# Patient Record
Sex: MALE | Race: BLACK OR AFRICAN AMERICAN | NOT HISPANIC OR LATINO | Employment: OTHER | URBAN - METROPOLITAN AREA
[De-identification: names, ages, dates, MRNs, and addresses within clinical notes are randomized per-mention and may not be internally consistent; named-entity substitution may affect disease eponyms.]

---

## 2017-08-04 ENCOUNTER — TELEPHONE (OUTPATIENT)
Dept: OTHER | Facility: OUTPATIENT CENTER | Age: 57
End: 2017-08-04

## 2017-08-04 NOTE — TELEPHONE ENCOUNTER
[facility] Telephone Intake    Date:  2017  Client Name:  Luis Godinez  Preferred Name: Luis    MRN:  [id number]   :  1960       Age:  56 year old     Presenting Problem / Reason for Assessment   (Clinical History &Symptoms):     Pt states that a foot fetish is becoming an obsessive behavior and effecting their daily thoguhts to the point where they cannot complete daily tasks.     Suggested Program:  CSB    Length of time experiencing Symptoms: last few years    Seen Other Providers (if so, where):  M.D. :  [facility]  Therapist:  no  Psychiatrist:  no    Is presenting concern primarily sexual or mental health:      Couples:  No    Medications:    Pt cannot recall.    Diagnosis (if known):  Pt cannot recall, says most meds are for pain.    Referral Source:  friend    Legal:  Has one charge for forging a Dr Rx for medication. Is currently on probation.     Follow Up:    Insurance Benefits to be evaluated.  Note will be entered when validated.    Patient wishes to be contacted regarding Insurance benefits: NO    Please Verify Registration    Please send Welcome Packet and document date sent.

## 2017-08-08 NOTE — TELEPHONE ENCOUNTER
PER [facility] MA - NO CO-PAY, NO IND DEDUCT, NO CO-INS, NO OOPM - NO AUTH REQUIRED, NO EXCLUSIONS. PATIENT DID NOT WANT TO BE CONTACTED RE: TRAMAINE.

## 2017-11-20 ENCOUNTER — TRANSFERRED RECORDS (OUTPATIENT)
Dept: HEALTH INFORMATION MANAGEMENT | Facility: CLINIC | Age: 57
End: 2017-11-20

## 2017-12-08 ENCOUNTER — TRANSFERRED RECORDS (OUTPATIENT)
Dept: HEALTH INFORMATION MANAGEMENT | Facility: CLINIC | Age: 57
End: 2017-12-08

## 2017-12-18 ENCOUNTER — TRANSFERRED RECORDS (OUTPATIENT)
Dept: HEALTH INFORMATION MANAGEMENT | Facility: CLINIC | Age: 57
End: 2017-12-18

## 2018-02-06 ENCOUNTER — TELEPHONE (OUTPATIENT)
Dept: NEUROSURGERY | Facility: CLINIC | Age: 58
End: 2018-02-06

## 2018-09-13 ENCOUNTER — HOSPITAL ENCOUNTER (EMERGENCY)
Facility: CLINIC | Age: 58
Discharge: HOME | End: 2018-09-13
Attending: EMERGENCY MEDICINE | Admitting: EMERGENCY MEDICINE
Payer: COMMERCIAL

## 2018-09-13 ENCOUNTER — APPOINTMENT (OUTPATIENT)
Dept: GENERAL RADIOLOGY | Facility: CLINIC | Age: 58
End: 2018-09-13
Attending: EMERGENCY MEDICINE
Payer: COMMERCIAL

## 2018-09-13 VITALS
RESPIRATION RATE: 20 BRPM | OXYGEN SATURATION: 95 % | TEMPERATURE: 97.1 F | DIASTOLIC BLOOD PRESSURE: 67 MMHG | HEART RATE: 85 BPM | SYSTOLIC BLOOD PRESSURE: 115 MMHG

## 2018-09-13 DIAGNOSIS — R07.89 ATYPICAL CHEST PAIN: ICD-10-CM

## 2018-09-13 PROBLEM — I26.99 OTHER PULMONARY EMBOLISM WITHOUT ACUTE COR PULMONALE, UNSPECIFIED CHRONICITY (H): Status: ACTIVE | Noted: 2018-07-13

## 2018-09-13 PROBLEM — F10.20 ALCOHOL USE DISORDER, MODERATE, DEPENDENCE (H): Status: ACTIVE | Noted: 2017-07-14

## 2018-09-13 PROBLEM — I26.99 ACUTE PULMONARY EMBOLISM (H): Status: ACTIVE | Noted: 2018-06-11

## 2018-09-13 LAB
ALBUMIN SERPL-MCNC: 3.7 G/DL (ref 3.4–5)
ALP SERPL-CCNC: 94 U/L (ref 40–150)
ALT SERPL W P-5'-P-CCNC: 67 U/L (ref 0–70)
ANION GAP SERPL CALCULATED.3IONS-SCNC: 8 MMOL/L (ref 3–14)
AST SERPL W P-5'-P-CCNC: 45 U/L (ref 0–45)
BASOPHILS # BLD AUTO: 0.1 10E9/L (ref 0–0.2)
BASOPHILS NFR BLD AUTO: 0.9 %
BILIRUB DIRECT SERPL-MCNC: <0.1 MG/DL (ref 0–0.2)
BILIRUB SERPL-MCNC: 0.2 MG/DL (ref 0.2–1.3)
BUN SERPL-MCNC: 18 MG/DL (ref 7–30)
CALCIUM SERPL-MCNC: 10.5 MG/DL (ref 8.5–10.1)
CHLORIDE SERPL-SCNC: 109 MMOL/L (ref 94–109)
CO2 SERPL-SCNC: 27 MMOL/L (ref 20–32)
CREAT SERPL-MCNC: 1.38 MG/DL (ref 0.66–1.25)
D DIMER PPP FEU-MCNC: 0.5 UG/ML FEU (ref 0–0.5)
DIFFERENTIAL METHOD BLD: NORMAL
EOSINOPHIL # BLD AUTO: 0.2 10E9/L (ref 0–0.7)
EOSINOPHIL NFR BLD AUTO: 2.8 %
ERYTHROCYTE [DISTWIDTH] IN BLOOD BY AUTOMATED COUNT: 14.1 % (ref 10–15)
GFR SERPL CREATININE-BSD FRML MDRD: 53 ML/MIN/1.7M2
GLUCOSE SERPL-MCNC: 118 MG/DL (ref 70–99)
HCT VFR BLD AUTO: 42.4 % (ref 40–53)
HGB BLD-MCNC: 13.5 G/DL (ref 13.3–17.7)
IMM GRANULOCYTES # BLD: 0.1 10E9/L (ref 0–0.4)
IMM GRANULOCYTES NFR BLD: 0.9 %
INR PPP: 1.04 (ref 0.86–1.14)
INTERPRETATION ECG - MUSE: NORMAL
LIPASE SERPL-CCNC: 217 U/L (ref 73–393)
LYMPHOCYTES # BLD AUTO: 1.3 10E9/L (ref 0.8–5.3)
LYMPHOCYTES NFR BLD AUTO: 23.4 %
MCH RBC QN AUTO: 28.3 PG (ref 26.5–33)
MCHC RBC AUTO-ENTMCNC: 31.8 G/DL (ref 31.5–36.5)
MCV RBC AUTO: 89 FL (ref 78–100)
MONOCYTES # BLD AUTO: 0.7 10E9/L (ref 0–1.3)
MONOCYTES NFR BLD AUTO: 13.1 %
NEUTROPHILS # BLD AUTO: 3.1 10E9/L (ref 1.6–8.3)
NEUTROPHILS NFR BLD AUTO: 58.9 %
NRBC # BLD AUTO: 0 10*3/UL
NRBC BLD AUTO-RTO: 0 /100
PLATELET # BLD AUTO: 204 10E9/L (ref 150–450)
POTASSIUM SERPL-SCNC: 3.9 MMOL/L (ref 3.4–5.3)
PROT SERPL-MCNC: 7.7 G/DL (ref 6.8–8.8)
RBC # BLD AUTO: 4.77 10E12/L (ref 4.4–5.9)
SODIUM SERPL-SCNC: 144 MMOL/L (ref 133–144)
TROPONIN I SERPL-MCNC: <0.015 UG/L (ref 0–0.04)
TROPONIN I SERPL-MCNC: <0.015 UG/L (ref 0–0.04)
WBC # BLD AUTO: 5.3 10E9/L (ref 4–11)

## 2018-09-13 PROCEDURE — 25000128 H RX IP 250 OP 636: Performed by: EMERGENCY MEDICINE

## 2018-09-13 PROCEDURE — 80048 BASIC METABOLIC PNL TOTAL CA: CPT | Performed by: EMERGENCY MEDICINE

## 2018-09-13 PROCEDURE — 80076 HEPATIC FUNCTION PANEL: CPT | Performed by: EMERGENCY MEDICINE

## 2018-09-13 PROCEDURE — 85379 FIBRIN DEGRADATION QUANT: CPT | Performed by: EMERGENCY MEDICINE

## 2018-09-13 PROCEDURE — 85610 PROTHROMBIN TIME: CPT | Performed by: EMERGENCY MEDICINE

## 2018-09-13 PROCEDURE — 83690 ASSAY OF LIPASE: CPT | Performed by: EMERGENCY MEDICINE

## 2018-09-13 PROCEDURE — 93005 ELECTROCARDIOGRAM TRACING: CPT

## 2018-09-13 PROCEDURE — 25000132 ZZH RX MED GY IP 250 OP 250 PS 637: Performed by: EMERGENCY MEDICINE

## 2018-09-13 PROCEDURE — 71046 X-RAY EXAM CHEST 2 VIEWS: CPT

## 2018-09-13 PROCEDURE — 84484 ASSAY OF TROPONIN QUANT: CPT | Performed by: EMERGENCY MEDICINE

## 2018-09-13 PROCEDURE — 96360 HYDRATION IV INFUSION INIT: CPT

## 2018-09-13 PROCEDURE — 99285 EMERGENCY DEPT VISIT HI MDM: CPT | Mod: 25

## 2018-09-13 PROCEDURE — 96361 HYDRATE IV INFUSION ADD-ON: CPT

## 2018-09-13 PROCEDURE — 85025 COMPLETE CBC W/AUTO DIFF WBC: CPT | Performed by: EMERGENCY MEDICINE

## 2018-09-13 PROCEDURE — 36415 COLL VENOUS BLD VENIPUNCTURE: CPT | Performed by: EMERGENCY MEDICINE

## 2018-09-13 RX ORDER — NITROGLYCERIN 0.4 MG/1
0.4 TABLET SUBLINGUAL EVERY 5 MIN PRN
Status: DISCONTINUED | OUTPATIENT
Start: 2018-09-13 | End: 2018-09-13 | Stop reason: HOSPADM

## 2018-09-13 RX ORDER — OMEPRAZOLE 40 MG/1
40 CAPSULE, DELAYED RELEASE ORAL DAILY
Qty: 30 CAPSULE | Refills: 1 | Status: SHIPPED | OUTPATIENT
Start: 2018-09-13 | End: 2019-01-17

## 2018-09-13 RX ADMIN — OMEPRAZOLE 20 MG: 20 CAPSULE, DELAYED RELEASE ORAL at 08:02

## 2018-09-13 RX ADMIN — NITROGLYCERIN 0.4 MG: 0.4 TABLET SUBLINGUAL at 06:33

## 2018-09-13 RX ADMIN — NITROGLYCERIN 0.4 MG: 0.4 TABLET SUBLINGUAL at 08:29

## 2018-09-13 RX ADMIN — SODIUM CHLORIDE 1000 ML: 9 INJECTION, SOLUTION INTRAVENOUS at 07:01

## 2018-09-13 ASSESSMENT — ENCOUNTER SYMPTOMS
SHORTNESS OF BREATH: 1
BACK PAIN: 0
NECK PAIN: 0

## 2018-09-13 NOTE — ED AVS SNAPSHOT
[facility] Emergency Department    [address]    [facility] [address]    Phone:  [phone]    Fax:  [phone]                                       Luis Godinez   MRN: [id number]    Department:  [facility] Emergency Department   Date of Visit:  9/13/2018           After Visit Summary Signature Page     I have received my discharge instructions, and my questions have been answered. I have discussed any challenges I see with this plan with the nurse or doctor.    ..........................................................................................................................................  Patient/Patient Representative Signature      ..........................................................................................................................................  Patient Representative Print Name and Relationship to Patient    ..................................................               ................................................  Date                                   Time    ..........................................................................................................................................  Reviewed by Signature/Title    ...................................................              ..............................................  Date                                               Time          22EPIC Rev 08/18

## 2018-09-13 NOTE — ED AVS SNAPSHOT
[facility] Emergency Department    [address]    [facility] [address]    Phone:  [phone]    Fax:  [phone]                                       Luis Godinez   MRN: [id number]    Department:  [facility] Emergency Department   Date of Visit:  9/13/2018           Patient Information     Date Of Birth          1960        Your diagnoses for this visit were:     Atypical chest pain        You were seen by Corrie Jensen MD.      Follow-up Information     Follow up with [facility] Emergency Department.    Specialty:  EMERGENCY MEDICINE    Why:  immediately , If symptoms worsen    Contact information:    [address]  [facility] [address] [phone]        Follow up with [facility] In 3 days.    Contact information:    [address] [facility]  [phone]          Discharge Instructions       Discharge Instructions  Chest Pain    You have been seen today for chest pain or discomfort.  At this time, your provider has found no signs that your chest pain is due to a serious or life-threatening condition, (or you have declined more testing and/or admission to the hospital). However, sometimes there is a serious problem that does not show up right away. Your evaluation today may not be complete and you may need further testing and evaluation.     Generally, every Emergency Department visit should have a follow-up clinic visit with either a primary or a specialty clinic/provider. Please follow-up as instructed by your emergency provider today.  Return to the Emergency Department if:    Your chest pain changes, gets worse, starts to happen more often, or comes with less activity.    You are newly short of breath.    You get very weak or tired.    You pass out or faint.    You have any new symptoms, like fever, cough, numb legs, or you cough up blood.    You have anything else that worries  you.    Until you follow-up with your regular provider, please do the following:    Take one aspirin daily unless you have an allergy or are told not to by your provider.    If a stress test appointment has been made, go to the appointment.    If you have questions, contact your regular provider.    Follow-up with your regular provider/clinic as directed; this is very important.    If you were given a prescription for medicine here today, be sure to read all of the information (including the package insert) that comes with your prescription.  This will include important information about the medicine, its side effects, and any warnings that you need to know about.  The pharmacist who fills the prescription can provide more information and answer questions you may have about the medicine.  If you have questions or concerns that the pharmacist cannot address, please call or return to the Emergency Department.       Remember that you can always come back to the Emergency Department if you are not able to see your regular provider in the amount of time listed above, if you get any new symptoms, or if there is anything that worries you.      24 Hour Appointment Hotline       To make an appointment at any [facility] call [phone] ([phone]). If you don't have a family doctor or clinic, we will help you find one. [facility] are conveniently located to serve the needs of you and your family.          ED Discharge Orders     Exercise Stress Echocardiogram       Administration of IV contrast will be tailored to this examination per the appropriate written protocol listed in the Echocardiography department Protocol Book, or by the supervising Cardiologist. This may result in an order change.    Use of contrast is at the discretion of the supervising Cardiologist.                     Review of your medicines      START taking        Dose / Directions Last dose taken    omeprazole 40 MG capsule   Commonly  known as:  priLOSEC   Dose:  40 mg   Quantity:  30 capsule        Take 1 capsule (40 mg) by mouth daily   Refills:  1          Our records show that you are taking the medicines listed below. If these are incorrect, please call your family doctor or clinic.        Dose / Directions Last dose taken    AMOXICILLIN PO        Refills:  0        ATORVASTATIN CALCIUM PO        Refills:  0        COUMADIN PO        Refills:  0        GABAPENTIN PO        Refills:  0        GLIPIZIDE PO        Refills:  0                Prescriptions were sent or printed at these locations (1 Prescription)                   Other Prescriptions                Printed at Department/Unit printer (1 of 1)         omeprazole (PRILOSEC) 40 MG capsule                Procedures and tests performed during your visit     Basic metabolic panel    CBC + differential    D dimer quantitative    EKG 12 lead    Hepatic panel    INR    IV access    Lipase    Troponin I    Troponin I (now)    XR Chest 2 Views      Orders Needing Specimen Collection     None      Pending Results     Date and Time Order Name Status Description    9/13/2018 0806 XR Chest 2 Views Preliminary             Pending Culture Results     No orders found from 9/11/2018 to 9/14/2018.            Pending Results Instructions     If you had any lab results that were not finalized at the time of your Discharge, you can call the ED Lab Result RN at [phone]. You will be contacted by this team for any positive Lab results or changes in treatment. The nurses are available 7 days a week from 10A to 6:30P.  You can leave a message 24 hours per day and they will return your call.        Test Results From Your Hospital Stay        9/13/2018  6:43 AM      Component Results     Component Value Ref Range & Units Status    Troponin I ES <0.015 0.000 - 0.045 ug/L Final    The 99th percentile for upper reference range is 0.045 ug/L.  Troponin values   in the range of 0.045 - 0.120 ug/L may be  associated with risks of adverse   clinical events.           9/13/2018  6:25 AM      Component Results     Component Value Ref Range & Units Status    WBC 5.3 4.0 - 11.0 10e9/L Final    RBC Count 4.77 4.4 - 5.9 10e12/L Final    Hemoglobin 13.5 13.3 - 17.7 g/dL Final    Hematocrit 42.4 40.0 - 53.0 % Final    MCV 89 78 - 100 fl Final    MCH 28.3 26.5 - 33.0 pg Final    MCHC 31.8 31.5 - 36.5 g/dL Final    RDW 14.1 10.0 - 15.0 % Final    Platelet Count 204 150 - 450 10e9/L Final    Diff Method Automated Method  Final    % Neutrophils 58.9 % Final    % Lymphocytes 23.4 % Final    % Monocytes 13.1 % Final    % Eosinophils 2.8 % Final    % Basophils 0.9 % Final    % Immature Granulocytes 0.9 % Final    Nucleated RBCs 0 0 /100 Final    Absolute Neutrophil 3.1 1.6 - 8.3 10e9/L Final    Absolute Lymphocytes 1.3 0.8 - 5.3 10e9/L Final    Absolute Monocytes 0.7 0.0 - 1.3 10e9/L Final    Absolute Eosinophils 0.2 0.0 - 0.7 10e9/L Final    Absolute Basophils 0.1 0.0 - 0.2 10e9/L Final    Abs Immature Granulocytes 0.1 0 - 0.4 10e9/L Final    Absolute Nucleated RBC 0.0  Final         9/13/2018  6:43 AM      Component Results     Component Value Ref Range & Units Status    Sodium 144 133 - 144 mmol/L Final    Potassium 3.9 3.4 - 5.3 mmol/L Final    Chloride 109 94 - 109 mmol/L Final    Carbon Dioxide 27 20 - 32 mmol/L Final    Anion Gap 8 3 - 14 mmol/L Final    Glucose 118 (H) 70 - 99 mg/dL Final    Urea Nitrogen 18 7 - 30 mg/dL Final    Creatinine 1.38 (H) 0.66 - 1.25 mg/dL Final    GFR Estimate 53 (L) >60 mL/min/1.7m2 Final    Non  GFR Calc    GFR Estimate If Black 64 >60 mL/min/1.7m2 Final    African American GFR Calc    Calcium 10.5 (H) 8.5 - 10.1 mg/dL Final         9/13/2018  6:36 AM      Component Results     Component Value Ref Range & Units Status    D Dimer 0.5 0.0 - 0.50 ug/ml FEU Final    This D-dimer assay is intended for use in conjunction with a clinical pretest   probability assessment model to  exclude pulmonary embolism (PE) and deep   venous thrombosis (DVT) in outpatients suspected of PE or DVT. The cut-off   value is 0.5 ug/mL FEU.           9/13/2018  6:36 AM      Component Results     Component Value Ref Range & Units Status    INR 1.04 0.86 - 1.14 Final         9/13/2018  7:11 AM      Component Results     Component Value Ref Range & Units Status    Bilirubin Direct <0.1 0.0 - 0.2 mg/dL Final    Bilirubin Total 0.2 0.2 - 1.3 mg/dL Final    Albumin 3.7 3.4 - 5.0 g/dL Final    Protein Total 7.7 6.8 - 8.8 g/dL Final    Alkaline Phosphatase 94 40 - 150 U/L Final    ALT 67 0 - 70 U/L Final    AST 45 0 - 45 U/L Final         9/13/2018  7:11 AM      Component Results     Component Value Ref Range & Units Status    Lipase 217 73 - 393 U/L Final         9/13/2018  9:35 AM      Narrative     CHEST TWO VIEWS  9/13/2018 9:22 AM     HISTORY:  Chest pain.      COMPARISON: 2/12/2010    FINDINGS: Mild aortic tortuosity. Aortic calcification. Suboptimal  inspiration with hypoventilatory changes. Borderline cardiac  enlargement again seen.        Impression     IMPRESSION:  No acute consolidation.         9/13/2018  9:13 AM      Component Results     Component Value Ref Range & Units Status    Troponin I ES <0.015 0.000 - 0.045 ug/L Final    The 99th percentile for upper reference range is 0.045 ug/L.  Troponin values   in the range of 0.045 - 0.120 ug/L may be associated with risks of adverse   clinical events.                  Clinical Quality Measure: Blood Pressure Screening     Your blood pressure was checked while you were in the emergency department today. The last reading we obtained was  BP: 115/67 . Please read the guidelines below about what these numbers mean and what you should do about them.  If your systolic blood pressure (the top number) is less than 120 and your diastolic blood pressure (the bottom number) is less than 80, then your blood pressure is normal. There is nothing more that you need to do  "about it.  If your systolic blood pressure (the top number) is 120-139 or your diastolic blood pressure (the bottom number) is 80-89, your blood pressure may be higher than it should be. You should have your blood pressure rechecked within a year by a primary care provider.  If your systolic blood pressure (the top number) is 140 or greater or your diastolic blood pressure (the bottom number) is 90 or greater, you may have high blood pressure. High blood pressure is treatable, but if left untreated over time it can put you at risk for heart attack, stroke, or kidney failure. You should have your blood pressure rechecked by a primary care provider within the next 4 weeks.  If your provider in the emergency department today gave you specific instructions to follow-up with your doctor or provider even sooner than that, you should follow that instruction and not wait for up to 4 weeks for your follow-up visit.        Thank you for choosing [facility]       Thank you for choosing [facility] for your care. Our goal is always to provide you with excellent care. Hearing back from our patients is one way we can continue to improve our services. Please take a few minutes to complete the written survey that you may receive in the mail after you visit with us. Thank you!        [employer] Information     [employer] lets you send messages to your doctor, view your test results, renew your prescriptions, schedule appointments and more. To sign up, go to [email or web address] . Click on \"Log in\" on the left side of the screen, which will take you to the Welcome page. Then click on \"Sign up Now\" on the right side of the page.     You will be asked to enter the access code listed below, as well as some personal information. Please follow the directions to create your username and password.     Your access code is: [email or web address]  Expires: 2018 10:32 AM     Your access code will  in 90 days. If you need help or a new code, please " call your [facility] clinic or [phone].        Care EveryWhere ID     This is your Care EveryWhere ID. This could be used by other organizations to access your [facility] medical records  [id number]        Equal Access to Services     PAM HORNE : Eloina Torres, waaxda luqadaha, qaybta kaalmada melanikiloradha, hilda mitchell. So [facility] [phone].    ATENCIÓN: Si habla español, tiene a roman disposición servicios gratuitos de asistencia lingüística. Llame al [phone].    We comply with applicable federal civil rights laws and [place] laws. We do not discriminate on the basis of race, color, national origin, age, disability, sex, sexual orientation, or gender identity.            After Visit Summary       This is your record. Keep this with you and show to your community pharmacist(s) and doctor(s) at your next visit.

## 2018-09-13 NOTE — ED TRIAGE NOTES
Drinking and using cocaine tonight. C/o reflux.    Pt A&O x 3, CMS x 3, ABCD's adequate in triage

## 2018-09-13 NOTE — ED PROVIDER NOTES
History     Chief Complaint:  Chest Pain    HPI   Luis Godinez is a 57 year old male with a history of hypertension, coronary artery disease, diabetes, cocaine abuse, and a pulmonary embolism who presents to the ED today for an evaluation of chest pain. Last night, around 2000, the patient reports smoking cocaine and having about 4 vodka drinks. Then, this morning, around 0986-2260, he developed a central, burning chest pain, which he attributed to his GERD. He notes that he is out of his medication for his GERD, and thus, he presented to the ED this morning for evaluation. Here in the ED, he reports that when he has had this pain in the past, it often radiates to his neck, though he has no radiation today. He adds that the pain is exacerbated by lying down, but he denies any further exacerbating factors. He also notes some progressively worsening shortness of breath and increased leg swelling. He denies any back pain or current suicidal ideations. Of note, he has a history of a PE 3 months ago, but adds that today's pain is different. However, he adds that he has not been compliant with his coumadin for the last 2 weeks. He additionally has not been compliant with his glipizide.     Cardiac/PE/DVT Risk Factors:  The patient has a history of hypertension, diabetes, cocaine abuse, and former smoking. He reports no family history. He denies a history of MI. The patient reports a personal history of PE, but denies any history of a DVT, or clotting disorder. The patient reports no recent travel, surgery, or other immobilizations.     Allergies:  Morphine  Penicillin    Medications:    Atorvastatin  Gabapentin  Glipizide  Coumadin    Past Medical History:    CAD  Chronic back pain  CKD  CVA  Vitamin D deficiency  Depression  GERD  Cocaine abuse  HTN  SHANEL  Prostate cancer  PE    Past Surgical History:    Radical Prostatectomy  Lumbar Laminectomy    Family History:    No past pertinent family history.    Social  History:  Marital Status:  Single [1]  Former smoker  Uses Alcohol regularly  Cocaine use    Review of Systems   Respiratory: Positive for shortness of breath.    Cardiovascular: Positive for chest pain and leg swelling.   Musculoskeletal: Negative for back pain and neck pain.   Psychiatric/Behavioral: Negative for suicidal ideas.   All other systems reviewed and are negative.    Physical Exam     Patient Vitals for the past 24 hrs:   BP Temp Pulse Resp SpO2   09/13/18 1030 115/67 - - - 95 %   09/13/18 1015 126/71 - - - 100 %   09/13/18 1000 124/75 - - - -   09/13/18 0945 120/67 - - - 99 %   09/13/18 0930 121/71 - - - 100 %   09/13/18 0900 119/73 - - - -   09/13/18 0845 117/67 - - - -   09/13/18 0830 127/83 - - - -   09/13/18 0800 - - - - 95 %   09/13/18 0745 150/89 - - - 100 %   09/13/18 0730 143/90 - - - 100 %   09/13/18 0715 124/83 - - - 94 %   09/13/18 0700 (!) 128/93 - - - 92 %   09/13/18 0645 (!) 135/93 - - - -   09/13/18 0630 (!) 173/124 - - - 92 %   09/13/18 0615 (!) 156/98 - - - 94 %   09/13/18 0600 (!) 146/95 - - - 91 %   09/13/18 0545 (!) 141/101 - - - 92 %   09/13/18 0541 - - - - 94 %   09/13/18 0540 (!) 146/96 97.1  F (36.2  C) 85 20 -     Physical Exam  Gen: Pleasant, appears stated age.    Eye:   Pupils are equal, round, and reactive.     Sclera injected.    ENT:   Moist mucus membranes.     Normal tongue.    Oropharynx without lesions.    Cardiac:     Normal rate and regular rhythm.    No murmurs, gallops, or rubs.   2+ radial pulses    Pulmonary:     Clear to auscultation bilaterally.    No wheezes, rales, or rhonchi.    Abdomen:     Normal active bowel sounds.     Abdomen is soft and non-distended, without focal tenderness.    Musculoskeletal:     Normal movement of all extremities without evidence for deficit.    Extremities:    Non-pitting lower extremity edema. Non tender calves.     Skin:   Warm and dry.    Neurologic:    Non-focal exam without asymmetric weakness or numbness.    Normal  tone    Psychiatric:     Normal affect with appropriate interaction with examiner.    Emergency Department Course   ECG:  Indication: Drug use  Time: 0548  Vent. Rate 84 bpm. UT interval 174. QRS duration 88. QT/QTc 368/434. P-R-T axis 37 13 16.  Normal sinus rhythm. Normal ECG. Read time: 0554    Imaging:  Radiographic findings were communicated with the patient who voiced understanding of the findings.  XR Chest 2 views:   No acute consolidation, as per radiology.     Laboratory:  CBC: WBC: 5.3, HGB: 13.5, PLT: 204  BMP: Glucose 118 (H), Calcium: 10.5 (H), Creatinine: 1.38 (H), o/w WNL    0557 Troponin: <0.015  0830 Troponin: <0.015    D dimer: 0.5    INR: 1.04    Hepatic panel: all WNL    Lipase: 217    Interventions:  0633 Nitroglycerin, 0.4 mg, sublingual  0701 NS 1L IV  0802 Prilosec, 20 mg, oral     Emergency Department Course:  Nursing notes and vitals reviewed. (0601) I performed an exam of the patient as documented above.     IV inserted. Medicine administered as documented above. Blood drawn. This was sent to the lab for further testing, results above.    The patient was sent for a chest x-ray while in the emergency department, findings above.     EKG obtained in the ED, see results above.     (0801) I rechecked the patient and discussed the results of his workup thus far. He reports that his pain has improved, but is still present.     (1015) I reevaluated the patient and provided an update in regards to his ED course.     Findings and plan explained to the Patient. Patient discharged home with instructions regarding supportive care, medications, and reasons to return. The importance of close follow-up was reviewed. The patient was prescribed Prilosec.     I personally reviewed the laboratory results with the Patient and answered all related questions prior to discharge.     Impression & Plan      HEART Score  Background  Calculates the overall risk of adverse event in patient's presenting with chest  pain.  Based on 5 criteria (each assigned 0-2 points) including suspiciousness of history, EKG, age, risk factors and troponin.    Data  57 year old male  has Acute pulmonary embolism (H); Alcohol use disorder, moderate, dependence (H); Chemical dependency (H); Chest pain radiating to arm; Chronic low back pain; CKD (chronic kidney disease) stage 3, GFR 30-59 ml/min; Cocaine substance abuse; Dysthymic disorder; Erectile dysfunction; Facet arthropathy, lumbosacral; GERD (gastroesophageal reflux disease); Hyperlipidemia LDL goal <100; Hypertension; Other pulmonary embolism without acute cor pulmonale, unspecified chronicity (H); SHANEL (obstructive sleep apnea); and Type 2 diabetes mellitus (H) on his problem list.   reports that he has quit smoking. He does not have any smokeless tobacco history on file.  family history is not on file.  Lab Results   Component Value Date    TROPI <0.015 09/13/2018     Criteria   0-2 points for each of 5 items (maximum of 10 points):  Score 0- History slightly suspicious for coronary syndrome  Score 0- EKG Normal  Score 1- Age 45 to 65 years old  Score 2- Three or more risk factors for or history of atherosclerotic disease  Score 0- Within normal limits for troponin levels  Interpretation  Risk of adverse outcome  Heart Score: 3  Total Score 0-3- Adverse Outcome Risk 2.5% - Supports early discharge with appropriate follow-up  Medical Decision Making:   Luis Godinez is a 57 year old male with a history of hypertension, coronary artery disease, diabetes, and cocaine abuse, as well as PE, who presents today with epigastric and chest burning pain. The pain is not exertional and is worse when the patient lies back. He feels that it is similar in character to his reflux pain. On exam, the patient was initially slightly hypertensive, although that has improved without intervention. Otherwise, his exam was non focal. EKG does not demonstrate any ischemic findings. Troponins X2 are negative.  Chest pain resolved by a combo of nitroglycerin and Prilosec. Otherwise, d dimer is negative. Patient reports he has not been compliant with his coumadin which is reflected in his INR. At this point, my suspicion for acute coronary syndrome is low. The patient has been ruled out for acute MI with the above interventions. I have ordered an out patient stress test given multiple risk factors. I do not suspect aortic dissection, PE, pericarditis, myocarditis, pneumonia, or esophageal rupture.  I stressed the importance of ceasing cocaine abuse. I advised the patient to restart coumadin given recent PE diagnosis.  He will be bridged with lovenox.  Dose adjustment is not necessary for current GFR.  At this point, I think the patient is safe to be discharged home. He will return to the ED immediately for any return of pain, syncope, shortness of breath, or for any other concerning symptoms.     Diagnosis:    ICD-10-CM    1. Atypical chest pain R07.89 Troponin I     Exercise Stress Echocardiogram     Disposition:  discharged to home    Discharge Medications:   Details   omeprazole (PRILOSEC) 40 MG capsule Take 1 capsule (40 mg) by mouth daily, Disp-30 capsule, R-1, Local Print   lovenox 100mg subcutaneous bid    Scribe Disclosure:  I, Sapphire Bey, am serving as a scribe on 9/13/2018 at 6:01 AM to personally document services performed by Corrie Jensen MD based on my observations and the provider's statements to me.     Sapphire Bey  9/13/2018   [facility] EMERGENCY DEPARTMENT       Corrie Jensen MD  09/13/18 0571

## 2018-09-13 NOTE — ED NOTES
All patient questions have been answered, no further questions at this time. Discharge paperwork was gone over with patient. Patient verbalizes understanding of discharge teaching, medications and the importance of follow up.  Patient verbalizes feeling safe returning home at this time. Patient was given informational handout for Stress test, directed towards pharmacy, and given a bus token for a ride home.

## 2018-09-14 NOTE — ED NOTES
Pt called ED to clarify when to start using the enoxaparin.  Pt instructed to start the medication as soon as possible.  Pt verbalized understanding.

## 2018-09-19 ENCOUNTER — OFFICE VISIT (OUTPATIENT)
Dept: INTERNAL MEDICINE | Facility: CLINIC | Age: 58
End: 2018-09-19
Payer: COMMERCIAL

## 2018-09-19 VITALS
DIASTOLIC BLOOD PRESSURE: 70 MMHG | OXYGEN SATURATION: 95 % | HEIGHT: 68 IN | TEMPERATURE: 98.6 F | SYSTOLIC BLOOD PRESSURE: 112 MMHG | WEIGHT: 247 LBS | RESPIRATION RATE: 14 BRPM | BODY MASS INDEX: 37.44 KG/M2 | HEART RATE: 92 BPM

## 2018-09-19 DIAGNOSIS — E66.01 MORBID OBESITY (H): ICD-10-CM

## 2018-09-19 DIAGNOSIS — M47.817 FACET ARTHROPATHY, LUMBOSACRAL: ICD-10-CM

## 2018-09-19 DIAGNOSIS — F14.10 COCAINE SUBSTANCE ABUSE (H): ICD-10-CM

## 2018-09-19 DIAGNOSIS — I26.99 OTHER ACUTE PULMONARY EMBOLISM WITHOUT ACUTE COR PULMONALE (H): Primary | ICD-10-CM

## 2018-09-19 PROCEDURE — 99214 OFFICE O/P EST MOD 30 MIN: CPT | Performed by: INTERNAL MEDICINE

## 2018-09-19 RX ORDER — GABAPENTIN 300 MG/1
300 CAPSULE ORAL 3 TIMES DAILY
Qty: 90 CAPSULE
Start: 2018-09-19 | End: 2019-01-17

## 2018-09-19 ASSESSMENT — PAIN SCALES - GENERAL: PAINLEVEL: WORST PAIN (10)

## 2018-09-19 NOTE — PROGRESS NOTES
ASSESSMENT/PLAN:     56 yo male with history of GERD, CKD, DMII, cocaine abuse and he follows with his PCP Dr. Cyrus Palomino.       He recently was diagnosed with acute PE back in 6/2018 and was started on coumadin. He is not compliant with his medication evidenced by recent INR which was subtherapeutic.  He was given enoxaparin during ED visit which I urged he use and resume coumadin at 7.5mg with close follow up with PCP. He could be considered for NOAC use for better compliance. He verbalized understanding    In addition, I encouraged patient to follow up on cocaine use treatment program which is in [place].      In regards to his lower back pain radiating to right leg in setting of facet arthropathy, I have put in a referral for neurosurg given worsening pain.  There is a MRI report visible through [facility] from 2016, which showed severe narrowing of right L5-S1 foramen, at the L3-L4 level there is lateral disc bulging. These both may be affecting respective root and he should get eval by NSG.       Edmund Alves MD  [facility]      SUBJECTIVE:   Luis Godinez is a 57 year old male who presents to clinic today for the following health issues:      ED/UC Followup:    Facility:  [facility] ER  Date of visit: 9/13/18  Reason for visit: Atypical chest pain  Current Status: He is feeling better.       56 yo male with history of GERD, CKD, DMII, cocaine abuse and he follows with his PCP Dr. Cyrus Palomino.      He was recently in ED for atypical chest pain, ACS ruled out, d dimer negative, he is here for a follow up on this    Patient has history of recent PE, on warfarin bridging lovenox.  Not clear why patient was not started on NOAC instead of warfarin but I am assuming its due to CKD, although CrCl >30.     Patient also with cocaine abuse, he reports last using prior to ED visit on 9/13/18    He also reports low right back pain and sciatica with right leg pain      Problem  "list and histories reviewed & adjusted, as indicated.  Additional history: as documented    Patient Active Problem List   Diagnosis     Acute pulmonary embolism (H)     Alcohol use disorder, moderate, dependence (H)     Chemical dependency (H)     Chest pain radiating to arm     Chronic low back pain     CKD (chronic kidney disease) stage 3, GFR 30-59 ml/min     Cocaine substance abuse     Dysthymic disorder     Erectile dysfunction     Facet arthropathy, lumbosacral     GERD (gastroesophageal reflux disease)     Hyperlipidemia LDL goal <100     Hypertension     Other pulmonary embolism without acute cor pulmonale, unspecified chronicity (H)     SHANEL (obstructive sleep apnea)     Type 2 diabetes mellitus (H)     Obesity (BMI 35.0-39.9) with comorbidity (H)     Past Surgical History:   Procedure Laterality Date      Robotic Radical Prostatectomy   11/2013     LUMBAR LAMINECTOMY NOS  11/2012       Social History   Substance Use Topics     Smoking status: Former Smoker     Smokeless tobacco: Never Used     Alcohol use Yes     History reviewed. No pertinent family history.        Reviewed and updated as needed this visit by clinical staff  Tobacco  Allergies  Meds  Med Hx  Surg Hx  Fam Hx  Soc Hx      Reviewed and updated as needed this visit by Provider         ROS:  Constitutional, HEENT, cardiovascular, pulmonary, gi and gu systems are negative, except as otherwise noted.    OBJECTIVE:     /70 (BP Location: Right arm, Patient Position: Sitting, Cuff Size: Adult Large)  Pulse 92  Temp 98.6  F (37  C) (Oral)  Resp 14  Ht 5' 7.5\" (1.715 m)  Wt 247 lb (112 kg)  SpO2 95%  BMI 38.11 kg/m2  Body mass index is 38.11 kg/(m^2).  GENERAL: healthy, alert and no distress  NECK: no adenopathy, no asymmetry, masses, or scars and thyroid normal to palpation  RESP: lungs clear to auscultation - no rales, rhonchi or wheezes  CV: regular rate and rhythm, normal S1 S2, no S3 or S4, no murmur, click or rub, no " peripheral edema and peripheral pulses strong  ABDOMEN: soft, obese, nontender, no hepatosplenomegaly  MS: no gross musculoskeletal defects noted, no edema  NEURO:  Tenderness to palpation of right lower back, +straight leg test on R    Diagnostic Test Results:  none

## 2018-09-19 NOTE — NURSING NOTE
"Vital signs:  Temp: 98.6  F (37  C) Temp src: Oral BP: 112/70 Pulse: 92   Resp: 14 SpO2: 95 %     Height: 5' 7.5\" (171.5 cm) Weight: 247 lb (112 kg)  Estimated body mass index is 38.11 kg/(m^2) as calculated from the following:    Height as of this encounter: 5' 7.5\" (1.715 m).    Weight as of this encounter: 247 lb (112 kg).        "

## 2018-09-19 NOTE — MR AVS SNAPSHOT
After Visit Summary   9/19/2018    Luis Godinez    MRN: [id number]           Patient Information     Date Of Birth          1960        Visit Information        Provider Department      9/19/2018 1:00 PM Edmund Alves MD [facility]        Today's Diagnoses     Cocaine substance abuse    -  1    Morbid obesity (H)        Other acute pulmonary embolism without acute cor pulmonale (H)        Facet arthropathy, lumbosacral           Follow-ups after your visit        Additional Services     NEUROSURGERY REFERRAL       Your provider has referred you to: [facility] [phone]   [email or web address]    Please be aware that coverage of these services is subject to the terms and limitations of your health insurance plan.  Call member services at your health plan with any benefit or coverage questions.      Please bring the following with you to your appointment:    (1) Any X-Rays, CTs or MRIs which have been performed.  Contact the facility where they were done to arrange for  prior to your scheduled appointment.   (2) List of current medications  (3) This referral request   (4) Any documents/labs given to you for this referral                  Your next 10 appointments already scheduled     Oct 01, 2018  2:00 PM CDT   Ech Stress Test with RHSTRESS   [facility] ([facility])    [address] [facility] [id number] [phone]           1. Please bring or wear a comfortable two-piece outfit and walking shoes. 2. Stop eating 3 hours before the test. You may drink water or juice. 3. Stop all caffeine 12 hours before the test. This includes coffee, tea, soda pop, chocolate and certain medicines (such as Anacin and Excederin). Also avoid decaf coffee and tea, as these contain small amounts of caffeine. 4. No alcohol, smoking or use of other tobacco products for 12  "hours before the test. 5. Refer to your provider instructions to see if you need to stop any medications (such as beta-blockers or nitrates) for this test. 6. For patients with diabetes: - If you take insulin, call your diabetes care team. Ask if you should take a   dose the morning of your test. - If you take diabetes medicine by mouth, dont take it on the morning of your test. Bring it with you to take after the test. (If you have questions, call your diabetes care team) 7. When you arrive, please tell us if: - You have diabetes. - You have taken Viagra, Cialis or Levitra in the past 48 hours. 8. For any questions that cannot be answered, please contact the ordering physician 9. Please do not wear perfumes or scented lotions on the day of your exam.              Who to contact     If you have questions or need follow up information about today's clinic visit or your schedule please contact [facility] directly at [phone].  Normal or non-critical lab and imaging results will be communicated to you by [employer], letter or phone within 4 business days after the clinic has received the results. If you do not hear from us within 7 days, please contact the clinic through [employer] or phone. If you have a critical or abnormal lab result, we will notify you by phone as soon as possible.  Submit refill requests through [employer] or call your pharmacy and they will forward the refill request to us. Please allow 3 business days for your refill to be completed.          Additional Information About Your Visit        [employer] Information     [employer] lets you send messages to your doctor, view your test results, renew your prescriptions, schedule appointments and more. To sign up, go to [email or web address] . Click on \"Log in\" on the left side of the screen, which will take you to the Welcome page. Then click on \"Sign up Now\" on the right side of the page.     You will be asked to enter the access code listed " "below, as well as some personal information. Please follow the directions to create your username and password.     Your access code is: [email or web address]  Expires: 2018 10:32 AM     Your access code will  in 90 days. If you need help or a new code, please call your [facility] or [phone].        Care EveryWhere ID     This is your Care EveryWhere ID. This could be used by other organizations to access your [place] medical records  [id number]        Your Vitals Were     Pulse Temperature Respirations Height Pulse Oximetry BMI (Body Mass Index)    92 98.6  F (37  C) (Oral) 14 5' 7.5\" (1.715 m) 95% 38.11 kg/m2       Blood Pressure from Last 3 Encounters:   18 112/70   18 115/67    Weight from Last 3 Encounters:   18 247 lb (112 kg)              We Performed the Following     NEUROSURGERY REFERRAL          Today's Medication Changes          These changes are accurate as of 18  1:42 PM.  If you have any questions, ask your nurse or doctor.               These medicines have changed or have updated prescriptions.        Dose/Directions    gabapentin 300 MG capsule   Commonly known as:  NEURONTIN   This may have changed:    - medication strength  - how much to take  - when to take this   Used for:  Facet arthropathy, lumbosacral   Changed by:  Edmund Alves MD        Dose:  300 mg   Take 1 capsule (300 mg) by mouth 3 times daily   Quantity:  90 capsule   Refills:  0            Where to get your medicines      Some of these will need a paper prescription and others can be bought over the counter.  Ask your nurse if you have questions.     You don't need a prescription for these medications     gabapentin 300 MG capsule                Primary Care Provider Office Phone # Fax #    [facility] [phone]       [address] [place] [address]        Equal Access to Services     PAM HORNE AH: radha Mckinley qaybta " hilda wilkins angelo montano ah. So [facility] [phone].    ATENCIÓN: Si agustín sarmiento, tiene a roman disposición servicios gratuitos de asistencia lingüística. Carla al [phone].    We comply with applicable federal civil rights laws and [place] laws. We do not discriminate on the basis of race, color, national origin, age, disability, sex, sexual orientation, or gender identity.            Thank you!     Thank you for choosing [facility]  for your care. Our goal is always to provide you with excellent care. Hearing back from our patients is one way we can continue to improve our services. Please take a few minutes to complete the written survey that you may receive in the mail after your visit with us. Thank you!             Your Updated Medication List - Protect others around you: Learn how to safely use, store and throw away your medicines at www.disposemymeds.org.          This list is accurate as of 9/19/18  1:42 PM.  Always use your most recent med list.                   Brand Name Dispense Instructions for use Diagnosis    ATORVASTATIN CALCIUM PO           COUMADIN PO           enoxaparin 100 MG/ML injection    LOVENOX    14 mL    Inject 1 mL (100 mg) Subcutaneous 2 times daily for 7 days        gabapentin 300 MG capsule    NEURONTIN    90 capsule    Take 1 capsule (300 mg) by mouth 3 times daily    Facet arthropathy, lumbosacral       GLIPIZIDE PO           omeprazole 40 MG capsule    priLOSEC    30 capsule    Take 1 capsule (40 mg) by mouth daily

## 2018-10-10 ENCOUNTER — TELEPHONE (OUTPATIENT)
Dept: INTERNAL MEDICINE | Facility: CLINIC | Age: 58
End: 2018-10-10

## 2018-10-10 NOTE — LETTER
[facility]  [address], [address]  [place] [address]  [phone]        October 31, 2018    Luis Godinez  [address] [facility] [address]            Dear Mr. Luis Godinez:    We sent you a letter a couple of weeks ago informing you of health maintenance that is due. We hope that you received it. This letter is just a follow up to remind you to schedule an appointment.         Sincerely,        Your [facility] Care Team

## 2018-10-10 NOTE — TELEPHONE ENCOUNTER
Panel Management Review      Patient has the following on his problem list:     Diabetes    ASA: Failed    Last A1C  No results found for: A1C  A1C tested: MONITOR    Last LDL:    Lab Results   Component Value Date    CHOL 195 01/10/2009     Lab Results   Component Value Date    HDL 46 01/10/2009     Lab Results   Component Value Date     01/10/2009     Lab Results   Component Value Date    TRIG 84 01/10/2009     Lab Results   Component Value Date    CHOLHDLRATIO 4.3 01/10/2009     No results found for: NHDL    Is the patient on a Statin? YES             Is the patient on Aspirin? NO    Medications     HMG CoA Reductase Inhibitors    ATORVASTATIN CALCIUM PO          Last three blood pressure readings:  BP Readings from Last 3 Encounters:   09/19/18 112/70   09/13/18 115/67       Date of last diabetes office visit: 7/25/18     Tobacco History:     History   Smoking Status     Former Smoker   Smokeless Tobacco     Never Used         Hypertension   Last three blood pressure readings:  BP Readings from Last 3 Encounters:   09/19/18 112/70   09/13/18 115/67     Blood pressure: Passed    HTN Guidelines:  Age 18-59 BP range:  Less than 140/90  Age 60-85 with Diabetes:  Less than 140/90  Age 60-85 without Diabetes:  less than 150/90      Composite cancer screening  Chart review shows that this patient is due/due soon for the following Colonoscopy  Summary:    Patient is due/failing the following:   COLONOSCOPY    Action needed:   Patient needs office visit for colonoscopy.    Type of outreach:    Sent letter.    Questions for provider review:    None                                                                                                                                    Yesenia Nesbitt MA       Chart routed to none .

## 2018-10-10 NOTE — LETTER
[facility]  [address], [address]  [place] [address]  [phone]        October 10, 2018    [address]  [facility] [address]            Dear Mr. Luis Godinez:    In order to ensure we are providing the best quality care, we have reviewed your chart and see that you are due for a colonoscopy.   Please call the clinic at your earliest convenience to schedule an appointment.   Thank you for trusting us with your health care.      Sincerely,        Dr. Edmund Alves

## 2018-10-23 ENCOUNTER — OFFICE VISIT (OUTPATIENT)
Dept: NEUROSURGERY | Facility: CLINIC | Age: 58
End: 2018-10-23
Attending: NURSE PRACTITIONER
Payer: COMMERCIAL

## 2018-10-23 ENCOUNTER — HOSPITAL ENCOUNTER (OUTPATIENT)
Dept: MRI IMAGING | Facility: CLINIC | Age: 58
Discharge: HOME | End: 2018-10-23
Attending: NURSE PRACTITIONER | Admitting: NURSE PRACTITIONER
Payer: COMMERCIAL

## 2018-10-23 VITALS
OXYGEN SATURATION: 94 % | SYSTOLIC BLOOD PRESSURE: 138 MMHG | BODY MASS INDEX: 37.44 KG/M2 | HEIGHT: 68 IN | HEART RATE: 101 BPM | DIASTOLIC BLOOD PRESSURE: 91 MMHG | WEIGHT: 247 LBS

## 2018-10-23 DIAGNOSIS — M54.16 LUMBAR RADICULAR PAIN: ICD-10-CM

## 2018-10-23 DIAGNOSIS — M54.16 LUMBAR RADICULAR PAIN: Primary | ICD-10-CM

## 2018-10-23 PROCEDURE — 72148 MRI LUMBAR SPINE W/O DYE: CPT

## 2018-10-23 PROCEDURE — 99204 OFFICE O/P NEW MOD 45 MIN: CPT | Performed by: NURSE PRACTITIONER

## 2018-10-23 PROCEDURE — G0463 HOSPITAL OUTPT CLINIC VISIT: HCPCS

## 2018-10-23 ASSESSMENT — PAIN SCALES - GENERAL: PAINLEVEL: WORST PAIN (10)

## 2018-10-23 NOTE — MR AVS SNAPSHOT
After Visit Summary   10/23/2018    Luis Godinez    MRN: [id number]           Patient Information     Date Of Birth          1960        Visit Information        Provider Department      10/23/2018 11:40 AM Krissy Gibson APRN CNP [facility]        Care Instructions    Appointment scheduled @ 11:40 am on 10/24/2018 to see Dr. Woodruff          Follow-ups after your visit        Your next 10 appointments already scheduled     Oct 23, 2018 11:40 AM CDT   New Visit with ADORE Peoples CNP   [facility] ([facility])    [id number] [place] [address] [address]  [facility] [id number] [phone]            Oct 24, 2018 11:40 AM CDT   Return Visit with Tereso Woodruff MD   [facility] ([facility])    [address] [place] [address] [address]  [facility] [address] [phone]              Who to contact     If you have questions or need follow up information about today's clinic visit or your schedule please contact [facility] directly at [phone].  Normal or non-critical lab and imaging results will be communicated to you by MyChart, letter or phone within 4 business days after the clinic has received the results. If you do not hear from us within 7 days, please contact the clinic through MyChart or phone. If you have a critical or abnormal lab result, we will notify you by phone as soon as possible.  Submit refill requests through [employer] or call your pharmacy and they will forward the refill request to us. Please allow 3 business days for your refill to be completed.          Additional Information About Your Visit        Care EveryWhere ID     This is your Care EveryWhere ID. This could be used by other organizations to access your [facility] medical records  [id number]        Your Vitals Were     Pulse Height Pulse Oximetry BMI (Body Mass  "Index)          101 5' 7.65\" (1.718 m) 94% 37.95 kg/m2         Blood Pressure from Last 3 Encounters:   10/23/18 (!) 138/91   09/19/18 112/70   09/13/18 115/67    Weight from Last 3 Encounters:   10/23/18 247 lb (112 kg)   09/19/18 247 lb (112 kg)              Today, you had the following     No orders found for display       Primary Care Provider Office Phone # Fax #    [facility] [phone]       [facility]        Equal Access to Services     PAM HORNE : Hadii aad ku hadasho Soomaali, waaxda luqadaha, qaybta kaalmada adeegyada, hilda montano . So [facility] [phone].    ATENCIÓN: Si habla español, tiene a roman disposición servicios gratuitos de asistencia lingüística. [facility] [phone].    We comply with applicable federal civil rights laws and [place] laws. We do not discriminate on the basis of race, color, national origin, age, disability, sex, sexual orientation, or gender identity.            Thank you!     Thank you for choosing [facility]  for your care. Our goal is always to provide you with excellent care. Hearing back from our patients is one way we can continue to improve our services. Please take a few minutes to complete the written survey that you may receive in the mail after your visit with us. Thank you!             Your Updated Medication List - Protect others around you: Learn how to safely use, store and throw away your medicines at www.disposemymeds.org.          This list is accurate as of 10/23/18 11:30 AM.  Always use your most recent med list.                   Brand Name Dispense Instructions for use Diagnosis    ATORVASTATIN CALCIUM PO           COUMADIN PO           gabapentin 300 MG capsule    NEURONTIN    90 capsule    Take 1 capsule (300 mg) by mouth 3 times daily    Facet arthropathy, lumbosacral       GLIPIZIDE PO           omeprazole 40 MG capsule    priLOSEC    30 " capsule    Take 1 capsule (40 mg) by mouth daily

## 2018-10-23 NOTE — PROGRESS NOTES
Dr. Tereso Woodruff  [facility]  Neurosurgery Clinic Visit        CC: low back and right leg pain     Primary care Provider: Clinic, [facility]      Reason For Visit:   I was asked by [facility] to consult on the patient for lumbar radicular pain.      HPI: Luis Godinez is a 57 year old male with lumbar radicular pain on the right. He notes that he has this pain for many years.  He has had previous right L5-S1 hemilaminectomy in 2013 at [facility] but he is not sure of the surgeon.  He states that this is the same pain he had prior to surgery but much worse.  He notes that his right leg pain is to the lateral thigh to the foot. He feels the leg is numb and weak at times. He notes pain with ROM or any activity.  He last had injections about 1 year ago and he did not feel that it helped.      Pain at its worst 10  Pain right now:  10    Past Medical History:   Diagnosis Date     CAD (coronary artery disease)      Chronic low back pain      CKD (chronic kidney disease)      CVA (cerebral vascular accident) (H)     When using drugs, crack cocaine (Age 26)     Depression      GERD (gastroesophageal reflux disease)      h/o Cocaine abuse      Hypertension      SHANEL (obstructive sleep apnea)      Prostate cancer (H)      Pulmonary embolism (H) 06/2018     Vitamin D deficiency        Past Medical History reviewed with patient during visit.    Past Surgical History:   Procedure Laterality Date      Robotic Radical Prostatectomy   11/2013     LUMBAR LAMINECTOMY NOS  11/2012     Past Surgical History reviewed with patient during visit.    Current Outpatient Prescriptions   Medication     ATORVASTATIN CALCIUM PO     gabapentin (NEURONTIN) 300 MG capsule     GLIPIZIDE PO     omeprazole (PRILOSEC) 40 MG capsule     Warfarin Sodium (COUMADIN PO)     No current facility-administered medications for this visit.        Allergies   Allergen Reactions     Morphine      Penicillins   "      Social History     Social History     Marital status: Single     Spouse name: N/A     Number of children: N/A     Years of education: N/A     Social History Main Topics     Smoking status: Former Smoker     Smokeless tobacco: Never Used     Alcohol use Yes     Drug use: Yes     Special: Cocaine     Sexual activity: Not Asked     Other Topics Concern     None     Social History Narrative       History reviewed. No pertinent family history.      Review Of Systems  Skin: negative  Eyes: negative  Ears/Nose/Throat: negative  Respiratory: SHANEL  Cardiovascular: HTN/HLD  Gastrointestinal: negative  Genitourinary: negative  Musculoskeletal: back pain  Neurologic: right leg pain   Psychiatric: negative  Hematologic/Lymphatic/Immunologic: PE on Warfarin   Endocrine: diabetes     ROS: 10 point ROS neg other than the symptoms noted above in the HPI.    Vital Signs: BP (!) 138/91 (BP Location: Right arm, Patient Position: Sitting, Cuff Size: Adult Large)  Pulse 101  Ht 5' 7.65\" (1.718 m)  Wt 247 lb (112 kg)  SpO2 94%  BMI 37.95 kg/m2    Examination:  Constitutional:  Alert, well nourished, NAD.  Memory: recent and remote memory intact  HEENT: Normocephalic, atraumatic.   Pulm:  Without shortness of breath   CV:  No pitting edema of BLE.    Neurological:  Awake  Alert  Oriented x 3  Speech clear  Cranial nerves II - XII intact  PERRL  EOMI  Face symmetric  Tongue midline  Motor exam   Shoulder Abduction:  Right:  5/5   Left:  5/5  Biceps:                      Right:  5/5   Left:  5/5  Triceps:                     Right:  5/5   Left:  5/5  Wrist Extensors:       Right:  5/5   Left:  5/5  Wrist Flexors:           Right:  5/5   Left:  5/5  Intrinsics:                   Right:  5/5   Left:  5/5   Hip Flexor:                Right: 5/5  Left:  5/5  Hip Adductor:             Right:  5/5  Left:  5/5  Hip Abductor:             Right:  5/5  Left:  5/5  Gastroc Soleus:        Right:  5/5  Left:  5/5  Tib/Ant:                      " Right:  5/5  Left:  5/5  EHL:                          Right:  5/5  Left:  5/5   Sensation normal to bilateral upper and lower extremities  Muscle tone to bilateral upper and lower extremities normal   Gait: Able to stand from a seated position. Antalgic gait due to right leg pain      Lumbar examination reveals tenderness of the spine and paraspinous muscles.  Pain with lumbar ROM.  Hip height is symmetrical. Negative SI joint, sciatic notch or greater trochanteric tenderness to palpation bilaterally.  Straight leg raise is positive on the right.       Imaging:     Lumbar MRI: 2017      Right L5-S1 herniation and nerve impingement.        Assessment/Plan:   Luis Godinez is a 57 year old male with lumbar radicular pain on the right. He notes that he has this pain for many years.  He has had previous right L5-S1 hemilaminectomy in 2013 at [facility] but he is not sure of the surgeon.  He states that this is the same pain he had prior to surgery but much worse.  He notes that his right leg pain is to the lateral thigh to the foot. He feels the leg is numb and weak at times. He notes pain with ROM or any activity.  He last had injections about 1 year ago and he did not feel that it helped. The pt is neurologically intact with severe pain to his low back and right leg. His lumbar MRI from 2017 does show a right herniation again at L5-S1 with nerve impingement.  He had injections after that MRI that did not help. He would like to see Dr. Tereso Woodruff to discuss surgery. He will need an updated lumbar MRI.  We did get him scheduled for tomorrow but he will need to move appt if he is not able to have this MRI done before tomorrow.            Patient Instructions   Appointment scheduled @ 11:40 am on 10/24/2018 to see Dr. Ranjan Gibson [facility]  [address] [address]    Tel [phone]  Pager [phone]

## 2018-10-23 NOTE — NURSING NOTE
"Luis Godinez is a 57 year old male who presents for:  Chief Complaint   Patient presents with     Neurologic Problem     Low back pain, right hip pain and right foot numbness and tingling         Vitals:    Vitals:    10/23/18 1113   BP: (!) 138/91   BP Location: Right arm   Patient Position: Sitting   Cuff Size: Adult Large   Pulse: 101   SpO2: 94%   Weight: 247 lb (112 kg)   Height: 5' 7.65\" (1.718 m)       BMI:  Estimated body mass index is 37.95 kg/(m^2) as calculated from the following:    Height as of this encounter: 5' 7.65\" (1.718 m).    Weight as of this encounter: 247 lb (112 kg).    Pain Score:  Worst Pain (10)      Do you feel safe in your environment?  Yes      Alee Taylor          "

## 2018-10-23 NOTE — PATIENT INSTRUCTIONS
Appointment scheduled @ 11:40 am on 10/24/2018 to see Dr. Woodruff    Patient to follow up with Primary Care provider regarding elevated blood pressure.

## 2018-10-23 NOTE — LETTER
10/23/2018         RE: Luis Godinez  [address] [facility] [address]  [place] [address]        Dear Colleague,    Thank you for referring your patient, Luis Godinez, to the [facility]. Please see a copy of my visit note below.    Dr. Tereso Woodruff  [facility]  Neurosurgery Clinic Visit        CC: low back and right leg pain     Primary care Provider: Clinic, [facility]      Reason For Visit:   I was asked by [facility] to consult on the patient for lumbar radicular pain.      HPI: Luis Godinez is a 57 year old male with lumbar radicular pain on the right. He notes that he has this pain for many years.  He has had previous right L5-S1 hemilaminectomy in 2013 at [facility] but he is not sure of the surgeon.  He states that this is the same pain he had prior to surgery but much worse.  He notes that his right leg pain is to the lateral thigh to the foot. He feels the leg is numb and weak at times. He notes pain with ROM or any activity.  He last had injections about 1 year ago and he did not feel that it helped.      Pain at its worst 10  Pain right now:  10    Past Medical History:   Diagnosis Date     CAD (coronary artery disease)      Chronic low back pain      CKD (chronic kidney disease)      CVA (cerebral vascular accident) (H)     When using drugs, crack cocaine (Age 26)     Depression      GERD (gastroesophageal reflux disease)      h/o Cocaine abuse      Hypertension      SHANEL (obstructive sleep apnea)      Prostate cancer (H)      Pulmonary embolism (H) 06/2018     Vitamin D deficiency        Past Medical History reviewed with patient during visit.    Past Surgical History:   Procedure Laterality Date      Robotic Radical Prostatectomy   11/2013     LUMBAR LAMINECTOMY NOS  11/2012     Past Surgical History reviewed with patient during visit.    Current Outpatient Prescriptions   Medication     ATORVASTATIN CALCIUM PO     gabapentin  "(NEURONTIN) 300 MG capsule     GLIPIZIDE PO     omeprazole (PRILOSEC) 40 MG capsule     Warfarin Sodium (COUMADIN PO)     No current facility-administered medications for this visit.        Allergies   Allergen Reactions     Morphine      Penicillins        Social History     Social History     Marital status: Single     Spouse name: N/A     Number of children: N/A     Years of education: N/A     Social History Main Topics     Smoking status: Former Smoker     Smokeless tobacco: Never Used     Alcohol use Yes     Drug use: Yes     Special: Cocaine     Sexual activity: Not Asked     Other Topics Concern     None     Social History Narrative       History reviewed. No pertinent family history.      Review Of Systems  Skin: negative  Eyes: negative  Ears/Nose/Throat: negative  Respiratory: SHANEL  Cardiovascular: HTN/HLD  Gastrointestinal: negative  Genitourinary: negative  Musculoskeletal: back pain  Neurologic: right leg pain   Psychiatric: negative  Hematologic/Lymphatic/Immunologic: PE on Warfarin   Endocrine: diabetes     ROS: 10 point ROS neg other than the symptoms noted above in the HPI.    Vital Signs: BP (!) 138/91 (BP Location: Right arm, Patient Position: Sitting, Cuff Size: Adult Large)  Pulse 101  Ht 5' 7.65\" (1.718 m)  Wt 247 lb (112 kg)  SpO2 94%  BMI 37.95 kg/m2    Examination:  Constitutional:  Alert, well nourished, NAD.  Memory: recent and remote memory intact  HEENT: Normocephalic, atraumatic.   Pulm:  Without shortness of breath   CV:  No pitting edema of BLE.    Neurological:  Awake  Alert  Oriented x 3  Speech clear  Cranial nerves II - XII intact  PERRL  EOMI  Face symmetric  Tongue midline  Motor exam   Shoulder Abduction:  Right:  5/5   Left:  5/5  Biceps:                      Right:  5/5   Left:  5/5  Triceps:                     Right:  5/5   Left:  5/5  Wrist Extensors:       Right:  5/5   Left:  5/5  Wrist Flexors:           Right:  5/5   Left:  5/5  Intrinsics:                   Right: "  5/5   Left:  5/5   Hip Flexor:                Right: 5/5  Left:  5/5  Hip Adductor:             Right:  5/5  Left:  5/5  Hip Abductor:             Right:  5/5  Left:  5/5  Gastroc Soleus:        Right:  5/5  Left:  5/5  Tib/Ant:                      Right:  5/5  Left:  5/5  EHL:                          Right:  5/5  Left:  5/5   Sensation normal to bilateral upper and lower extremities  Muscle tone to bilateral upper and lower extremities normal   Gait: Able to stand from a seated position. Antalgic gait due to right leg pain      Lumbar examination reveals tenderness of the spine and paraspinous muscles.  Pain with lumbar ROM.  Hip height is symmetrical. Negative SI joint, sciatic notch or greater trochanteric tenderness to palpation bilaterally.  Straight leg raise is positive on the right.       Imaging:     Lumbar MRI: 2017      Right L5-S1 herniation and nerve impingement.        Assessment/Plan:   Luis Godinez is a 57 year old male with lumbar radicular pain on the right. He notes that he has this pain for many years.  He has had previous right L5-S1 hemilaminectomy in 2013 at [facility] but he is not sure of the surgeon.  He states that this is the same pain he had prior to surgery but much worse.  He notes that his right leg pain is to the lateral thigh to the foot. He feels the leg is numb and weak at times. He notes pain with ROM or any activity.  He last had injections about 1 year ago and he did not feel that it helped. The pt is neurologically intact with severe pain to his low back and right leg. His lumbar MRI from 2017 does show a right herniation again at L5-S1 with nerve impingement.  He had injections after that MRI that did not help. He would like to see Dr. Tereso Woodruff to discuss surgery. He will need an updated lumbar MRI.  We did get him scheduled for tomorrow but he will need to move appt if he is not able to have this MRI done before tomorrow.            Patient Instructions    Appointment scheduled @ 11:40 am on 10/24/2018 to see Dr. Ranjan [facility] CNP  [facility]  [place] [address]    Tel [phone]  Pager [phone]      Again, thank you for allowing me to participate in the care of your patient.        Sincerely,        ADORE Peoples CNP

## 2019-01-17 ENCOUNTER — OFFICE VISIT (OUTPATIENT)
Dept: INTERNAL MEDICINE | Facility: CLINIC | Age: 59
End: 2019-01-17
Payer: COMMERCIAL

## 2019-01-17 VITALS
OXYGEN SATURATION: 95 % | BODY MASS INDEX: 37.42 KG/M2 | DIASTOLIC BLOOD PRESSURE: 76 MMHG | WEIGHT: 246.9 LBS | TEMPERATURE: 98 F | HEART RATE: 100 BPM | RESPIRATION RATE: 14 BRPM | HEIGHT: 68 IN | SYSTOLIC BLOOD PRESSURE: 118 MMHG

## 2019-01-17 DIAGNOSIS — E66.01 MORBID OBESITY (H): ICD-10-CM

## 2019-01-17 DIAGNOSIS — E11.22 TYPE 2 DIABETES MELLITUS WITH STAGE 3 CHRONIC KIDNEY DISEASE, WITHOUT LONG-TERM CURRENT USE OF INSULIN (H): ICD-10-CM

## 2019-01-17 DIAGNOSIS — M54.41 CHRONIC RIGHT-SIDED LOW BACK PAIN WITH RIGHT-SIDED SCIATICA: Primary | ICD-10-CM

## 2019-01-17 DIAGNOSIS — N18.30 TYPE 2 DIABETES MELLITUS WITH STAGE 3 CHRONIC KIDNEY DISEASE, WITHOUT LONG-TERM CURRENT USE OF INSULIN (H): ICD-10-CM

## 2019-01-17 DIAGNOSIS — K21.9 GASTROESOPHAGEAL REFLUX DISEASE WITHOUT ESOPHAGITIS: ICD-10-CM

## 2019-01-17 DIAGNOSIS — G89.29 CHRONIC RIGHT-SIDED LOW BACK PAIN WITH RIGHT-SIDED SCIATICA: Primary | ICD-10-CM

## 2019-01-17 LAB — HBA1C MFR BLD: 6 % (ref 0–5.6)

## 2019-01-17 PROCEDURE — 83036 HEMOGLOBIN GLYCOSYLATED A1C: CPT | Performed by: INTERNAL MEDICINE

## 2019-01-17 PROCEDURE — 36415 COLL VENOUS BLD VENIPUNCTURE: CPT | Performed by: INTERNAL MEDICINE

## 2019-01-17 PROCEDURE — 99214 OFFICE O/P EST MOD 30 MIN: CPT | Performed by: INTERNAL MEDICINE

## 2019-01-17 RX ORDER — CYCLOBENZAPRINE HCL 10 MG
10 TABLET ORAL 3 TIMES DAILY PRN
COMMUNITY
Start: 2018-12-20 | End: 2019-01-17

## 2019-01-17 RX ORDER — GLIPIZIDE 5 MG/1
5 TABLET ORAL
COMMUNITY
Start: 2018-07-25 | End: 2019-07-25

## 2019-01-17 RX ORDER — CYCLOBENZAPRINE HCL 10 MG
10 TABLET ORAL 3 TIMES DAILY PRN
Qty: 90 TABLET | Refills: 1 | Status: ON HOLD | OUTPATIENT
Start: 2019-01-17 | End: 2019-03-01

## 2019-01-17 RX ORDER — OMEPRAZOLE 40 MG/1
40 CAPSULE, DELAYED RELEASE ORAL EVERY MORNING
COMMUNITY
Start: 2018-10-16 | End: 2019-01-17

## 2019-01-17 RX ORDER — LISINOPRIL 20 MG/1
20 TABLET ORAL DAILY
COMMUNITY
Start: 2014-06-09

## 2019-01-17 RX ORDER — HYDROCHLOROTHIAZIDE 25 MG/1
25 TABLET ORAL DAILY
Status: ON HOLD | COMMUNITY
Start: 2018-07-25 | End: 2019-03-04

## 2019-01-17 RX ORDER — WARFARIN SODIUM 7.5 MG/1
7.5 TABLET ORAL DAILY
COMMUNITY
Start: 2018-06-15 | End: 2019-02-12

## 2019-01-17 RX ORDER — GABAPENTIN 300 MG/1
300 CAPSULE ORAL 3 TIMES DAILY
COMMUNITY
Start: 2018-12-20

## 2019-01-17 RX ORDER — OMEPRAZOLE 40 MG/1
40 CAPSULE, DELAYED RELEASE ORAL EVERY MORNING
Qty: 90 CAPSULE | Refills: 3 | Status: SHIPPED | OUTPATIENT
Start: 2019-01-17

## 2019-01-17 ASSESSMENT — ANXIETY QUESTIONNAIRES
1. FEELING NERVOUS, ANXIOUS, OR ON EDGE: NEARLY EVERY DAY
GAD7 TOTAL SCORE: 17
IF YOU CHECKED OFF ANY PROBLEMS ON THIS QUESTIONNAIRE, HOW DIFFICULT HAVE THESE PROBLEMS MADE IT FOR YOU TO DO YOUR WORK, TAKE CARE OF THINGS AT HOME, OR GET ALONG WITH OTHER PEOPLE: NOT DIFFICULT AT ALL
2. NOT BEING ABLE TO STOP OR CONTROL WORRYING: NEARLY EVERY DAY
7. FEELING AFRAID AS IF SOMETHING AWFUL MIGHT HAPPEN: MORE THAN HALF THE DAYS
3. WORRYING TOO MUCH ABOUT DIFFERENT THINGS: NEARLY EVERY DAY
6. BECOMING EASILY ANNOYED OR IRRITABLE: MORE THAN HALF THE DAYS
5. BEING SO RESTLESS THAT IT IS HARD TO SIT STILL: MORE THAN HALF THE DAYS

## 2019-01-17 ASSESSMENT — PATIENT HEALTH QUESTIONNAIRE - PHQ9
SUM OF ALL RESPONSES TO PHQ QUESTIONS 1-9: 12
5. POOR APPETITE OR OVEREATING: MORE THAN HALF THE DAYS

## 2019-01-17 ASSESSMENT — MIFFLIN-ST. JEOR: SCORE: 1898.87

## 2019-01-17 NOTE — NURSING NOTE
"Vital signs:  Temp: 98  F (36.7  C) Temp src: Oral BP: 118/76 Pulse: 100   Resp: 14 SpO2: 95 %     Height: 171.8 cm (5' 7.65\") Weight: 112 kg (246 lb 14.4 oz)  Estimated body mass index is 37.93 kg/m  as calculated from the following:    Height as of this encounter: 1.718 m (5' 7.65\").    Weight as of this encounter: 112 kg (246 lb 14.4 oz).          "

## 2019-01-17 NOTE — PROGRESS NOTES
ASSESSMENT/PLAN:       1. Chronic right-sided low back pain with right-sided sciatica    Patient has not seen Dr. [facility]ruff in [facility] yet and have arranged him follow up for 1/23/19.  Will refil flexeril for now. His diabetes is well controlled (HgbA1c at 6%) so could consider another course of prednisone if needed.     - cyclobenzaprine (FLEXERIL) 10 MG tablet; Take 1 tablet (10 mg) by mouth 3 times daily as needed  Dispense: 90 tablet; Refill: 1    2. Type 2 diabetes mellitus with stage 3 chronic kidney disease, without long-term current use of insulin (H)  See above, HgbA1c at 6% well controlled  - Hemoglobin A1c    3. Obesity (BMI 35.0-39.9) with comorbidity (H)  Discussed with patient who is working on weight loss    4. Gastroesophageal reflux disease without esophagitis  Continue PPI  - omeprazole (PRILOSEC) 40 MG DR capsule; Take 1 capsule (40 mg) by mouth every morning  Dispense: 90 capsule; Refill: 3      Edmund Alves MD  [facility]    SUBJECTIVE:   Luis Godinez is a 58 year old male who presents to clinic today for the following health issues:    He is here for a follow up.    His main complaint today is R-sided sciatica. He was seen by his PCP back in 12/20/18 and was given a prednisone burst that did help his pain.  He was also given flexeril that helped.    Narcotic pain medications are not indicated due to history of polysubstance abuse    PE-Currently on coumadin, was supposed to follow up with hematology for consideration of xarelto but has not yet    Patient has been off recreational drugs, currently on ADAP treatment      Problem list and histories reviewed & adjusted, as indicated.  Additional history: as documented    Patient Active Problem List   Diagnosis     Acute pulmonary embolism (H)     Alcohol use disorder, moderate, dependence (H)     Chemical dependency (H)     Chest pain radiating to arm     Chronic low back pain     CKD (chronic kidney disease) stage 3, GFR 30-59  "ml/min (H)     Cocaine substance abuse (H)     Dysthymic disorder     Erectile dysfunction     Facet arthropathy, lumbosacral     GERD (gastroesophageal reflux disease)     Hyperlipidemia LDL goal <100     Hypertension     Other pulmonary embolism without acute cor pulmonale, unspecified chronicity (H)     SHANEL (obstructive sleep apnea)     Type 2 diabetes mellitus (H)     Obesity (BMI 35.0-39.9) with comorbidity (H)     Past Surgical History:   Procedure Laterality Date      Robotic Radical Prostatectomy   11/2013     LUMBAR LAMINECTOMY NOS  11/2012       Social History     Tobacco Use     Smoking status: Former Smoker     Smokeless tobacco: Never Used   Substance Use Topics     Alcohol use: Yes     No family history on file.        Reviewed and updated as needed this visit by clinical staff  Allergies  Meds        Reviewed and updated as needed this visit by Provider         ROS:  Constitutional, HEENT, cardiovascular, pulmonary, gi and gu systems are negative, except as otherwise noted.    OBJECTIVE:     /76 (BP Location: Right arm, Patient Position: Sitting, Cuff Size: Adult Large)   Pulse 100   Temp 98  F (36.7  C) (Oral)   Resp 14   Ht 1.718 m (5' 7.65\")   Wt 112 kg (246 lb 14.4 oz)   SpO2 95%   BMI 37.93 kg/m    Body mass index is 37.93 kg/m .  GENERAL: healthy, alert and no distress  NECK: no adenopathy, no asymmetry, masses, or scars and thyroid normal to palpation  RESP: lungs clear to auscultation - no rales, rhonchi or wheezes  CV: regular rate and rhythm, normal S1 S2, no S3 or S4, no murmur, click or rub, no peripheral edema and peripheral pulses strong  ABDOMEN: soft, nontender, no hepatosplenomegaly, no masses and bowel sounds normal  MS: no gross musculoskeletal defects noted, no edema  Comprehensive back pain exam:  Straight leg positive on  right, indicating possible ipsilateral radiculopathy    Diagnostic Test Results:  none     "

## 2019-01-18 ASSESSMENT — ANXIETY QUESTIONNAIRES: GAD7 TOTAL SCORE: 17

## 2019-02-06 ENCOUNTER — OFFICE VISIT (OUTPATIENT)
Dept: NEUROSURGERY | Facility: CLINIC | Age: 59
End: 2019-02-06
Attending: NEUROLOGICAL SURGERY
Payer: COMMERCIAL

## 2019-02-06 VITALS
OXYGEN SATURATION: 96 % | HEIGHT: 67 IN | WEIGHT: 246 LBS | HEART RATE: 86 BPM | BODY MASS INDEX: 38.61 KG/M2 | DIASTOLIC BLOOD PRESSURE: 82 MMHG | SYSTOLIC BLOOD PRESSURE: 136 MMHG | TEMPERATURE: 97.4 F

## 2019-02-06 DIAGNOSIS — M54.16 LUMBAR RADICULAR PAIN: Primary | ICD-10-CM

## 2019-02-06 DIAGNOSIS — M71.38 SYNOVIAL CYST OF LUMBAR FACET JOINT: ICD-10-CM

## 2019-02-06 PROCEDURE — G0463 HOSPITAL OUTPT CLINIC VISIT: HCPCS

## 2019-02-06 PROCEDURE — 99214 OFFICE O/P EST MOD 30 MIN: CPT | Performed by: NEUROLOGICAL SURGERY

## 2019-02-06 ASSESSMENT — PAIN SCALES - GENERAL: PAINLEVEL: WORST PAIN (10)

## 2019-02-06 ASSESSMENT — MIFFLIN-ST. JEOR: SCORE: 1884.48

## 2019-02-06 NOTE — PATIENT INSTRUCTIONS
Patient Instructions  Pre-Operative:  -Surgery scheduled at [facility] for Redo right L5-S1 TLIF (transforaminal lumbar interbody fusion)  -Surgical risks: blood clots in the leg or lung, problems urinating, nerve damage, drainage from the incision, infection,stiffness  - Pre-operative physical with primary care physician within 30 days of surgical date.   -2-4 night hospitalization.    -Shower procedure: please shower with antimicrobial soap the night before surgery and morning of surgery. Please refer to showering instruction sheet in folder.  -Stop all solid foods 8 hours before surgery.  -Keep drinking clear liquids until 4 hours before surgery. Clear liquids include water, clear juice, black coffee, or clear tea without milk, Gatorade, clear soda.   - Discontinue coumadin/warfarin/Jantoven 5-7 days prior to surgery. INR needs to be <1.4.Please contact your prescribing physician to make sure it is safe to stop.   - Discontinue Aspirin, NSAIDs (Advil/Ibuprofen, Naproxen,Nuprin,Relafen/Nabumetone, Diclofenac,Meloxicam, Aleve, Celebrex) x 7 days prior to surgical date.    - May try tylenol for pain 1000 mg three times per day for pain        Post-Operative:  -Do not begin taking Non-Steroidal Anti-Inflammatory Drugs or NSAIDs (Advil/ibuprofen, Naproxen,Nuprin, Relafen/Nabumetone, Diclofenac,Meloxicam, Aleve, Celebrex, Aspirin, etc.) until 12 weeks after surgery. May cause bleeding and interfere with bone healing.   - Post operative incisional pain x 1-2 weeks which will require pain medications and muscle relaxants. You will receive medication upon discharge.  -Do NOT drive while taking narcotic pain medication.  -Do not drink alcohol while using any pain medication  -Post operative incision care- Watch for signs of infection: redness, swelling, warmth, drainage, and fever of 101 degrees or higher. Notify clinic [phone].  -No submerging incision in water such as pools, hot tubs,baths for at least  8 weeks or until incision is healed. Showers are fine.   - Post operative activity limitations: 6-8 weeks, no lifting > 10 pounds, no bending, twisting, or overhead reaching.  -Ok to walk as tolerated, avoid bed rest and prolonged sitting.  -No contact sports until after follow up visit  -No high impact activities such as; running/jogging, snowmobile or 4 [place] riding or any other recreational vehicles.  -Orthotics will fit you for a brace in the hospital.Lumbar Fusion: wear for 6-8 weeks       - Post op follow up appointments: 2 weeks suture/staple removal and 6 week post op follow up visit with Nurse practitioner and x-ray prior to appointment.Please call to schedule follow up appointment at [phone].  -If you are currently employed, you will need to be off work for 4-6 weeks for post op recovery and healing.

## 2019-02-06 NOTE — PROGRESS NOTES
Neurosurgery Follow Up Clinic Visit      Luis Godinez returns for follow up visit regarding recurrent right lower extremity pain    Currently the patient describes having had a right L5-S1 microdiscectomy and removal of synovial cyst in November 2011 by Dr. Ragland at [facility].  Patient now complains of low back pain and right hip and right lumbar 5 distribution pain.  He is failed epidural steroid injections and physical therapy and feels that his pain is significantly thickening his activities of daily living.    Exam is stable with no focal weakness  Decreased sensation on the right in the L5 distribution    We reviewed the MRI of his lumbar spine is noted to have severe bilateral foraminal stenosis at L5-S1 right greater than left and possible some residual synovial cyst versus herniated disc versus hypertrophied ligament      Plan:   I recommended a redo right L5-S1 decompression with facetectomy and resection of cystic mass or disc or ligamentum hypertrophy decompress the L5 root with a transforaminal lumbar interbody fusion.I discussed with the patient the risk of surgery to include, but, not be limited to; nerve injury, pseudoarthrosis, failure of hardware, failure of improvement of symptoms, CSF leak,  infection, post op hematoma, the need for recurrent surgery, paralysis, coma and death.

## 2019-02-06 NOTE — NURSING NOTE
Pre-operative Education    Education included but not limited to:  - Pre-operative physical with primary care physician within 30 days of surgical date. Pre op  [place].   - Pre-operative clearance (cardiology, hematology, etc).   - Discontinue Aspirin, NSAIDs, Diclofenac x 7 days prior to surgical date.   -  Discontinue coumadin/warfarin/Jantoven 5-7 days prior to surgery. INR needs to be <1.4.Please contact your prescribing physician to make sure it is safe to stop.   -May try tylenol for pain 1000 mg three times per day for pain  -Do not begin taking Non-Steroidal Anti-Inflammatory Drugs or NSAIDs (Advil,Motrin, Ibuprofen,Nuprin, Diclofenac,Meloxicam, Aleve, Celebrex, Aspirin, etc.) until 12 weeks after surgery if you had a fusion. May cause bleeding and interfere with bone healing.     -Patient is not a smoker  -Forms to be completed:none per patient  -Surgical risks: blood clots in the leg or lung, problems urinating, nerve damage, drainage from the incision, infection,stiffness  -Preparation timeline  - When to start NPO           -Special bathing procedure.Patient received Hibiclens and showering instruction sheet.   Hospital stay:  Checking in  The surgery itself   Recovery room  - Transition to the hospital room where you will begin your recovery  - Managing pain   - 2-4 night hospitalization.  - Post operative incisional pain x 1-2 weeks which will require pain medications and muscle relaxants.   -Do NOT drive or drink alcohol while taking narcotic pain medication.  -Post operative incision care-Keep your incision clean and dry at all times. OK to remove dressing on postop day 2. OK to shower on postop day 3 and allow water to run over incision, pat dry after shower. No bathing, swimming or submerging in water. Call immediately or come to ED if any drainage occurs, or you develop new pain. Watch for signs of infection: redness, swelling, warmth, drainage, and fever of 101 degrees or higher. Notify  clinic.   - Post operative activity limitations: 6-8 weeks, no lifting > 10 pounds, no bending, twisting, or overhead reaching.  -Ok to walk as tolerated, avoid bed rest and prolonged sitting.  -No contact sports until after follow up visit  -No high impact activities such as; running/jogging, snowmobile or [place] riding or any other recreational vehicles.  -Orthotics will fit you for a brace in the hospital.Lumbar Fusion: wear for 6-8 weeks     - Post op follow up appointments: 2 weeks for suture/staple removal and 6 weeks with Nurse Practitioner with X-ray prior. Please call to schedule follow up appointment at [phone].   - Spine Education book was also given to the patient for further review.      Patient verbalized understanding of above instructions. All questions were answered to the best of my ability and the patient's satisfaction. Patient advised to call with any additional questions or concerns.  Gretchen Novak RN

## 2019-02-06 NOTE — LETTER
2/6/2019         RE: Luis Godinez  [address]  [place] [address]        Dear Colleague,    Thank you for referring your patient, Luis Godinez, to the [facility]. Please see a copy of my visit note below.    Neurosurgery Follow Up Clinic Visit      Luis Godinez returns for follow up visit regarding recurrent right lower extremity pain    Currently the patient describes having had a right L5-S1 microdiscectomy and removal of synovial cyst in November 2011 by Dr. Ragland at [facility].  Patient now complains of low back pain and right hip and right lumbar 5 distribution pain.  He is failed epidural steroid injections and physical therapy and feels that his pain is significantly thickening his activities of daily living.    Exam is stable with no focal weakness  Decreased sensation on the right in the L5 distribution    We reviewed the MRI of his lumbar spine is noted to have severe bilateral foraminal stenosis at L5-S1 right greater than left and possible some residual synovial cyst versus herniated disc versus hypertrophied ligament      Plan:   I recommended a redo right L5-S1 decompression with facetectomy and resection of cystic mass or disc or ligamentum hypertrophy decompress the L5 root with a transforaminal lumbar interbody fusion.I discussed with the patient the risk of surgery to include, but, not be limited to; nerve injury, pseudoarthrosis, failure of hardware, failure of improvement of symptoms, CSF leak,  infection, post op hematoma, the need for recurrent surgery, paralysis, coma and death.              Again, thank you for allowing me to participate in the care of your patient.        Sincerely,        Tereso Woodruff MD

## 2019-02-12 SDOH — HEALTH STABILITY: MENTAL HEALTH: HOW OFTEN DO YOU HAVE A DRINK CONTAINING ALCOHOL?: NEVER

## 2019-02-12 ASSESSMENT — MIFFLIN-ST. JEOR: SCORE: 1884.48

## 2019-02-18 NOTE — TELEPHONE ENCOUNTER
Type of surgery: Redo right L5-S1 TLIF   Location of surgery: [facility]  Date and time of surgery: 03/01/2019 at 12:20 PM   Surgeon: Dr. Woodruff   Pre-Op Appt Date: Discussed   Post-Op Appt Date: 04/12/2019   Packet sent out: Yes  Pre-cert/Authorization completed:  Yes  Date: 02/18/2019

## 2019-02-27 NOTE — PHARMACY-ADMISSION MEDICATION HISTORY
Admission medication history interview status for this patient is complete, pre-adamitting RN. See [facility] admission navigator for allergy information, prior to admission medications and immunization status.           Prior to Admission medications    Medication Sig Last Dose Taking? Auth Provider   ATORVASTATIN CALCIUM PO Take 80 mg by mouth daily   Yes Reported, Patient   cyclobenzaprine (FLEXERIL) 10 MG tablet Take 1 tablet (10 mg) by mouth 3 times daily as needed  Yes Edmund Alves MD   gabapentin (NEURONTIN) 300 MG capsule Take 300 mg by mouth 3 times daily  Yes Reported, Patient   glipiZIDE (GLUCOTROL) 5 MG tablet Take 5 mg by mouth 2 times daily (before meals)  Yes Reported, Patient   hydrochlorothiazide (HYDRODIURIL) 25 MG tablet Take 25 mg by mouth daily  Yes Reported, Patient   lisinopril (PRINIVIL/ZESTRIL) 20 MG tablet Take 20 mg by mouth daily  Yes Reported, Patient   omeprazole (PRILOSEC) 40 MG DR capsule Take 1 capsule (40 mg) by mouth every morning  Yes Edmund Alves MD

## 2019-02-28 ENCOUNTER — ANESTHESIA EVENT (OUTPATIENT)
Dept: SURGERY | Facility: CLINIC | Age: 59
End: 2019-02-28
Payer: COMMERCIAL

## 2019-03-01 ENCOUNTER — APPOINTMENT (OUTPATIENT)
Dept: GENERAL RADIOLOGY | Facility: CLINIC | Age: 59
End: 2019-03-01
Attending: NEUROLOGICAL SURGERY
Payer: COMMERCIAL

## 2019-03-01 ENCOUNTER — ANESTHESIA (OUTPATIENT)
Dept: SURGERY | Facility: CLINIC | Age: 59
End: 2019-03-01
Payer: COMMERCIAL

## 2019-03-01 ENCOUNTER — HOSPITAL ENCOUNTER (INPATIENT)
Facility: CLINIC | Age: 59
LOS: 5 days | Discharge: HOME | End: 2019-03-06
Attending: NEUROLOGICAL SURGERY | Admitting: NEUROLOGICAL SURGERY
Payer: COMMERCIAL

## 2019-03-01 DIAGNOSIS — Z98.1 S/P LUMBAR FUSION: Primary | ICD-10-CM

## 2019-03-01 LAB
ABO + RH BLD: NORMAL
ABO + RH BLD: NORMAL
BLD GP AB SCN SERPL QL: NORMAL
BLOOD BANK CMNT PATIENT-IMP: NORMAL
GLUCOSE BLDC GLUCOMTR-MCNC: 116 MG/DL (ref 70–99)
GLUCOSE BLDC GLUCOMTR-MCNC: 136 MG/DL (ref 70–99)
GLUCOSE BLDC GLUCOMTR-MCNC: 154 MG/DL (ref 70–99)
GLUCOSE BLDC GLUCOMTR-MCNC: 170 MG/DL (ref 70–99)
SPECIMEN EXP DATE BLD: NORMAL

## 2019-03-01 PROCEDURE — 36000071 ZZH SURGERY LEVEL 5 W FLUORO 1ST 30 MIN: Performed by: NEUROLOGICAL SURGERY

## 2019-03-01 PROCEDURE — 37000008 ZZH ANESTHESIA TECHNICAL FEE, 1ST 30 MIN: Performed by: NEUROLOGICAL SURGERY

## 2019-03-01 PROCEDURE — 40000277 XR SURGERY CARM FLUORO LESS THAN 5 MIN W STILLS: Mod: TC

## 2019-03-01 PROCEDURE — 25800030 ZZH RX IP 258 OP 636: Performed by: ANESTHESIOLOGY

## 2019-03-01 PROCEDURE — 22840 INSERT SPINE FIXATION DEVICE: CPT | Performed by: NEUROLOGICAL SURGERY

## 2019-03-01 PROCEDURE — 86900 BLOOD TYPING SEROLOGIC ABO: CPT | Performed by: ANESTHESIOLOGY

## 2019-03-01 PROCEDURE — 72020 X-RAY EXAM OF SPINE 1 VIEW: CPT | Mod: TC

## 2019-03-01 PROCEDURE — C1713 ANCHOR/SCREW BN/BN,TIS/BN: HCPCS | Performed by: NEUROLOGICAL SURGERY

## 2019-03-01 PROCEDURE — 25000128 H RX IP 250 OP 636: Performed by: ANESTHESIOLOGY

## 2019-03-01 PROCEDURE — 25800030 ZZH RX IP 258 OP 636: Performed by: NURSE ANESTHETIST, CERTIFIED REGISTERED

## 2019-03-01 PROCEDURE — 0ST40ZZ RESECTION OF LUMBOSACRAL DISC, OPEN APPROACH: ICD-10-PCS | Performed by: NEUROLOGICAL SURGERY

## 2019-03-01 PROCEDURE — 36415 COLL VENOUS BLD VENIPUNCTURE: CPT | Performed by: ANESTHESIOLOGY

## 2019-03-01 PROCEDURE — 22853 INSJ BIOMECHANICAL DEVICE: CPT | Performed by: NEUROLOGICAL SURGERY

## 2019-03-01 PROCEDURE — 12000000 ZZH R&B MED SURG/OB

## 2019-03-01 PROCEDURE — C1762 CONN TISS, HUMAN(INC FASCIA): HCPCS | Performed by: NEUROLOGICAL SURGERY

## 2019-03-01 PROCEDURE — 86901 BLOOD TYPING SEROLOGIC RH(D): CPT | Performed by: ANESTHESIOLOGY

## 2019-03-01 PROCEDURE — 27210995 ZZH RX 272: Performed by: NEUROLOGICAL SURGERY

## 2019-03-01 PROCEDURE — 25000125 ZZHC RX 250: Performed by: NURSE ANESTHETIST, CERTIFIED REGISTERED

## 2019-03-01 PROCEDURE — 63047 LAM FACETEC & FORAMOT LUMBAR: CPT | Mod: 59 | Performed by: NEUROLOGICAL SURGERY

## 2019-03-01 PROCEDURE — 0SG30AJ FUSION OF LUMBOSACRAL JOINT WITH INTERBODY FUSION DEVICE, POSTERIOR APPROACH, ANTERIOR COLUMN, OPEN APPROACH: ICD-10-PCS | Performed by: NEUROLOGICAL SURGERY

## 2019-03-01 PROCEDURE — 00000146 ZZHCL STATISTIC GLUCOSE BY METER IP

## 2019-03-01 PROCEDURE — 36000069 ZZH SURGERY LEVEL 5 EA 15 ADDTL MIN: Performed by: NEUROLOGICAL SURGERY

## 2019-03-01 PROCEDURE — 22633 ARTHRD CMBN 1NTRSPC LUMBAR: CPT | Performed by: NEUROLOGICAL SURGERY

## 2019-03-01 PROCEDURE — 25000128 H RX IP 250 OP 636

## 2019-03-01 PROCEDURE — 86850 RBC ANTIBODY SCREEN: CPT | Performed by: ANESTHESIOLOGY

## 2019-03-01 PROCEDURE — 40000275 ZZH STATISTIC RCP TIME EA 10 MIN

## 2019-03-01 PROCEDURE — 40000985 XR LUMBAR SPINE PORT 1 VW: Mod: TC

## 2019-03-01 PROCEDURE — 94660 CPAP INITIATION&MGMT: CPT

## 2019-03-01 PROCEDURE — 25000300 ZZH OR RX SURGIFLO HEMOSTATIC MATRIX 10ML 199102S OPNP: Performed by: NEUROLOGICAL SURGERY

## 2019-03-01 PROCEDURE — 37000009 ZZH ANESTHESIA TECHNICAL FEE, EACH ADDTL 15 MIN: Performed by: NEUROLOGICAL SURGERY

## 2019-03-01 PROCEDURE — 25000128 H RX IP 250 OP 636: Performed by: NURSE ANESTHETIST, CERTIFIED REGISTERED

## 2019-03-01 PROCEDURE — 25000128 H RX IP 250 OP 636: Performed by: NEUROLOGICAL SURGERY

## 2019-03-01 PROCEDURE — 25000125 ZZHC RX 250: Performed by: NEUROLOGICAL SURGERY

## 2019-03-01 PROCEDURE — 25800025 ZZH RX 258: Performed by: NEUROLOGICAL SURGERY

## 2019-03-01 PROCEDURE — 71000013 ZZH RECOVERY PHASE 1 LEVEL 1 EA ADDTL HR: Performed by: NEUROLOGICAL SURGERY

## 2019-03-01 PROCEDURE — L8699 PROSTHETIC IMPLANT NOS: HCPCS | Performed by: NEUROLOGICAL SURGERY

## 2019-03-01 PROCEDURE — 25000132 ZZH RX MED GY IP 250 OP 250 PS 637: Performed by: NEUROLOGICAL SURGERY

## 2019-03-01 PROCEDURE — 0SG3071 FUSION OF LUMBOSACRAL JOINT WITH AUTOLOGOUS TISSUE SUBSTITUTE, POSTERIOR APPROACH, POSTERIOR COLUMN, OPEN APPROACH: ICD-10-PCS | Performed by: NEUROLOGICAL SURGERY

## 2019-03-01 PROCEDURE — 40000306 ZZH STATISTIC PRE PROC ASSESS II: Performed by: NEUROLOGICAL SURGERY

## 2019-03-01 PROCEDURE — 27210794 ZZH OR GENERAL SUPPLY STERILE: Performed by: NEUROLOGICAL SURGERY

## 2019-03-01 PROCEDURE — 71000012 ZZH RECOVERY PHASE 1 LEVEL 1 FIRST HR: Performed by: NEUROLOGICAL SURGERY

## 2019-03-01 DEVICE — IMP SCR SET MEDT SOLERA BREAK OFF 5.5MM TI 5540030: Type: IMPLANTABLE DEVICE | Site: SPINE LUMBAR | Status: FUNCTIONAL

## 2019-03-01 DEVICE — IMP SCR MEDT 5.5/6.0MM SOLERA 6.5X35MM MA 55840006535: Type: IMPLANTABLE DEVICE | Site: SPINE LUMBAR | Status: FUNCTIONAL

## 2019-03-01 DEVICE — IMPLANTABLE DEVICE: Type: IMPLANTABLE DEVICE | Site: SPINE LUMBAR | Status: FUNCTIONAL

## 2019-03-01 DEVICE — GRAFT BONE CRUSH CANC 30ML 400080: Type: IMPLANTABLE DEVICE | Site: SPINE LUMBAR | Status: FUNCTIONAL

## 2019-03-01 DEVICE — GRAFT BONE INFUSE BMP SM 7510200: Type: IMPLANTABLE DEVICE | Site: SPINE LUMBAR | Status: FUNCTIONAL

## 2019-03-01 DEVICE — IMP ROD MEDT SOLERA CVD 5.5X35MM TI 1553201035: Type: IMPLANTABLE DEVICE | Site: SPINE LUMBAR | Status: FUNCTIONAL

## 2019-03-01 DEVICE — IMP SCR MEDT 5.5/6.0MM SOLERA 6.5X45MM MA 55840006545: Type: IMPLANTABLE DEVICE | Site: SPINE LUMBAR | Status: FUNCTIONAL

## 2019-03-01 RX ORDER — DIPHENHYDRAMINE HCL 25 MG
25 CAPSULE ORAL EVERY 6 HOURS PRN
Status: DISCONTINUED | OUTPATIENT
Start: 2019-03-01 | End: 2019-03-06 | Stop reason: HOSPADM

## 2019-03-01 RX ORDER — SODIUM CHLORIDE, SODIUM LACTATE, POTASSIUM CHLORIDE, CALCIUM CHLORIDE 600; 310; 30; 20 MG/100ML; MG/100ML; MG/100ML; MG/100ML
INJECTION, SOLUTION INTRAVENOUS CONTINUOUS
Status: DISCONTINUED | OUTPATIENT
Start: 2019-03-01 | End: 2019-03-01 | Stop reason: HOSPADM

## 2019-03-01 RX ORDER — ONDANSETRON 4 MG/1
4 TABLET, ORALLY DISINTEGRATING ORAL EVERY 6 HOURS PRN
Status: DISCONTINUED | OUTPATIENT
Start: 2019-03-01 | End: 2019-03-06 | Stop reason: HOSPADM

## 2019-03-01 RX ORDER — KETOROLAC TROMETHAMINE 30 MG/ML
INJECTION, SOLUTION INTRAMUSCULAR; INTRAVENOUS PRN
Status: DISCONTINUED | OUTPATIENT
Start: 2019-03-01 | End: 2019-03-01

## 2019-03-01 RX ORDER — PROCHLORPERAZINE MALEATE 5 MG
10 TABLET ORAL EVERY 6 HOURS PRN
Status: DISCONTINUED | OUTPATIENT
Start: 2019-03-01 | End: 2019-03-06 | Stop reason: HOSPADM

## 2019-03-01 RX ORDER — GABAPENTIN 300 MG/1
300 CAPSULE ORAL 3 TIMES DAILY
Status: DISCONTINUED | OUTPATIENT
Start: 2019-03-01 | End: 2019-03-06 | Stop reason: HOSPADM

## 2019-03-01 RX ORDER — ACETAMINOPHEN 325 MG/1
650 TABLET ORAL EVERY 4 HOURS PRN
Status: DISCONTINUED | OUTPATIENT
Start: 2019-03-04 | End: 2019-03-01

## 2019-03-01 RX ORDER — NICOTINE POLACRILEX 4 MG
15-30 LOZENGE BUCCAL
Status: DISCONTINUED | OUTPATIENT
Start: 2019-03-01 | End: 2019-03-02

## 2019-03-01 RX ORDER — KETAMINE HYDROCHLORIDE 10 MG/ML
INJECTION INTRAMUSCULAR; INTRAVENOUS PRN
Status: DISCONTINUED | OUTPATIENT
Start: 2019-03-01 | End: 2019-03-01

## 2019-03-01 RX ORDER — FENTANYL CITRATE 50 UG/ML
INJECTION, SOLUTION INTRAMUSCULAR; INTRAVENOUS PRN
Status: DISCONTINUED | OUTPATIENT
Start: 2019-03-01 | End: 2019-03-01

## 2019-03-01 RX ORDER — BUPIVACAINE HYDROCHLORIDE AND EPINEPHRINE 5; 5 MG/ML; UG/ML
INJECTION, SOLUTION EPIDURAL; INTRACAUDAL; PERINEURAL PRN
Status: DISCONTINUED | OUTPATIENT
Start: 2019-03-01 | End: 2019-03-01 | Stop reason: HOSPADM

## 2019-03-01 RX ORDER — FENTANYL CITRATE 50 UG/ML
25-50 INJECTION, SOLUTION INTRAMUSCULAR; INTRAVENOUS
Status: DISCONTINUED | OUTPATIENT
Start: 2019-03-01 | End: 2019-03-01 | Stop reason: HOSPADM

## 2019-03-01 RX ORDER — DIAZEPAM 5 MG
5 TABLET ORAL EVERY 6 HOURS PRN
Status: DISCONTINUED | OUTPATIENT
Start: 2019-03-01 | End: 2019-03-06 | Stop reason: HOSPADM

## 2019-03-01 RX ORDER — NALOXONE HYDROCHLORIDE 0.4 MG/ML
.1-.4 INJECTION, SOLUTION INTRAMUSCULAR; INTRAVENOUS; SUBCUTANEOUS
Status: DISCONTINUED | OUTPATIENT
Start: 2019-03-01 | End: 2019-03-01

## 2019-03-01 RX ORDER — METOCLOPRAMIDE 5 MG/1
10 TABLET ORAL EVERY 6 HOURS PRN
Status: DISCONTINUED | OUTPATIENT
Start: 2019-03-01 | End: 2019-03-06 | Stop reason: HOSPADM

## 2019-03-01 RX ORDER — CEFAZOLIN SODIUM 2 G/100ML
2 INJECTION, SOLUTION INTRAVENOUS EVERY 8 HOURS
Status: DISCONTINUED | OUTPATIENT
Start: 2019-03-01 | End: 2019-03-05

## 2019-03-01 RX ORDER — HYDROCHLOROTHIAZIDE 25 MG/1
25 TABLET ORAL DAILY
Status: DISCONTINUED | OUTPATIENT
Start: 2019-03-01 | End: 2019-03-04

## 2019-03-01 RX ORDER — DEXTROSE MONOHYDRATE 25 G/50ML
25-50 INJECTION, SOLUTION INTRAVENOUS
Status: DISCONTINUED | OUTPATIENT
Start: 2019-03-01 | End: 2019-03-02

## 2019-03-01 RX ORDER — ONDANSETRON 2 MG/ML
INJECTION INTRAMUSCULAR; INTRAVENOUS PRN
Status: DISCONTINUED | OUTPATIENT
Start: 2019-03-01 | End: 2019-03-01

## 2019-03-01 RX ORDER — NALOXONE HYDROCHLORIDE 0.4 MG/ML
.1-.4 INJECTION, SOLUTION INTRAMUSCULAR; INTRAVENOUS; SUBCUTANEOUS
Status: DISCONTINUED | OUTPATIENT
Start: 2019-03-01 | End: 2019-03-06 | Stop reason: HOSPADM

## 2019-03-01 RX ORDER — CEFAZOLIN SODIUM 1 G/3ML
1 INJECTION, POWDER, FOR SOLUTION INTRAMUSCULAR; INTRAVENOUS SEE ADMIN INSTRUCTIONS
Status: DISCONTINUED | OUTPATIENT
Start: 2019-03-01 | End: 2019-03-01 | Stop reason: HOSPADM

## 2019-03-01 RX ORDER — LIDOCAINE 40 MG/G
CREAM TOPICAL
Status: DISCONTINUED | OUTPATIENT
Start: 2019-03-01 | End: 2019-03-06 | Stop reason: HOSPADM

## 2019-03-01 RX ORDER — LISINOPRIL 20 MG/1
20 TABLET ORAL DAILY
Status: DISCONTINUED | OUTPATIENT
Start: 2019-03-01 | End: 2019-03-06 | Stop reason: HOSPADM

## 2019-03-01 RX ORDER — METOPROLOL TARTRATE 1 MG/ML
1-2 INJECTION, SOLUTION INTRAVENOUS EVERY 5 MIN PRN
Status: DISCONTINUED | OUTPATIENT
Start: 2019-03-01 | End: 2019-03-01 | Stop reason: HOSPADM

## 2019-03-01 RX ORDER — ATORVASTATIN CALCIUM 40 MG/1
80 TABLET, FILM COATED ORAL DAILY
Status: DISCONTINUED | OUTPATIENT
Start: 2019-03-01 | End: 2019-03-06 | Stop reason: HOSPADM

## 2019-03-01 RX ORDER — DOCUSATE SODIUM 100 MG/1
100 CAPSULE, LIQUID FILLED ORAL 2 TIMES DAILY
Status: DISCONTINUED | OUTPATIENT
Start: 2019-03-01 | End: 2019-03-03

## 2019-03-01 RX ORDER — HYDROMORPHONE HYDROCHLORIDE 1 MG/ML
.3-.5 INJECTION, SOLUTION INTRAMUSCULAR; INTRAVENOUS; SUBCUTANEOUS
Status: DISCONTINUED | OUTPATIENT
Start: 2019-03-01 | End: 2019-03-06 | Stop reason: HOSPADM

## 2019-03-01 RX ORDER — VANCOMYCIN HCL 900 MCG/MG
POWDER (GRAM) MISCELLANEOUS PRN
Status: DISCONTINUED | OUTPATIENT
Start: 2019-03-01 | End: 2019-03-01 | Stop reason: HOSPADM

## 2019-03-01 RX ORDER — LIDOCAINE HYDROCHLORIDE 10 MG/ML
INJECTION, SOLUTION INFILTRATION; PERINEURAL PRN
Status: DISCONTINUED | OUTPATIENT
Start: 2019-03-01 | End: 2019-03-01

## 2019-03-01 RX ORDER — ACETAMINOPHEN 325 MG/1
650 TABLET ORAL EVERY 4 HOURS PRN
Status: DISCONTINUED | OUTPATIENT
Start: 2019-03-04 | End: 2019-03-06 | Stop reason: HOSPADM

## 2019-03-01 RX ORDER — ONDANSETRON 2 MG/ML
4 INJECTION INTRAMUSCULAR; INTRAVENOUS EVERY 30 MIN PRN
Status: DISCONTINUED | OUTPATIENT
Start: 2019-03-01 | End: 2019-03-01 | Stop reason: HOSPADM

## 2019-03-01 RX ORDER — ONDANSETRON 4 MG/1
4 TABLET, ORALLY DISINTEGRATING ORAL EVERY 30 MIN PRN
Status: DISCONTINUED | OUTPATIENT
Start: 2019-03-01 | End: 2019-03-01 | Stop reason: HOSPADM

## 2019-03-01 RX ORDER — SODIUM CHLORIDE AND POTASSIUM CHLORIDE 150; 900 MG/100ML; MG/100ML
INJECTION, SOLUTION INTRAVENOUS CONTINUOUS
Status: DISCONTINUED | OUTPATIENT
Start: 2019-03-01 | End: 2019-03-02

## 2019-03-01 RX ORDER — OXYCODONE HYDROCHLORIDE 5 MG/1
5-10 TABLET ORAL
Status: DISCONTINUED | OUTPATIENT
Start: 2019-03-01 | End: 2019-03-06 | Stop reason: HOSPADM

## 2019-03-01 RX ORDER — LIDOCAINE 40 MG/G
CREAM TOPICAL
Status: DISCONTINUED | OUTPATIENT
Start: 2019-03-01 | End: 2019-03-01 | Stop reason: HOSPADM

## 2019-03-01 RX ORDER — DIPHENHYDRAMINE HYDROCHLORIDE 50 MG/ML
25 INJECTION INTRAMUSCULAR; INTRAVENOUS EVERY 6 HOURS PRN
Status: DISCONTINUED | OUTPATIENT
Start: 2019-03-01 | End: 2019-03-06 | Stop reason: HOSPADM

## 2019-03-01 RX ORDER — ACETAMINOPHEN 325 MG/1
975 TABLET ORAL EVERY 8 HOURS
Status: COMPLETED | OUTPATIENT
Start: 2019-03-01 | End: 2019-03-04

## 2019-03-01 RX ORDER — ONDANSETRON 2 MG/ML
4 INJECTION INTRAMUSCULAR; INTRAVENOUS EVERY 6 HOURS PRN
Status: DISCONTINUED | OUTPATIENT
Start: 2019-03-01 | End: 2019-03-06 | Stop reason: HOSPADM

## 2019-03-01 RX ORDER — DEXAMETHASONE SODIUM PHOSPHATE 4 MG/ML
INJECTION, SOLUTION INTRA-ARTICULAR; INTRALESIONAL; INTRAMUSCULAR; INTRAVENOUS; SOFT TISSUE PRN
Status: DISCONTINUED | OUTPATIENT
Start: 2019-03-01 | End: 2019-03-01

## 2019-03-01 RX ORDER — PROPOFOL 10 MG/ML
INJECTION, EMULSION INTRAVENOUS PRN
Status: DISCONTINUED | OUTPATIENT
Start: 2019-03-01 | End: 2019-03-01

## 2019-03-01 RX ORDER — CEFAZOLIN SODIUM 2 G/100ML
2 INJECTION, SOLUTION INTRAVENOUS
Status: COMPLETED | OUTPATIENT
Start: 2019-03-01 | End: 2019-03-01

## 2019-03-01 RX ORDER — METOCLOPRAMIDE HYDROCHLORIDE 5 MG/ML
10 INJECTION INTRAMUSCULAR; INTRAVENOUS EVERY 6 HOURS PRN
Status: DISCONTINUED | OUTPATIENT
Start: 2019-03-01 | End: 2019-03-06 | Stop reason: HOSPADM

## 2019-03-01 RX ORDER — HYDROMORPHONE HYDROCHLORIDE 1 MG/ML
.3-.5 INJECTION, SOLUTION INTRAMUSCULAR; INTRAVENOUS; SUBCUTANEOUS EVERY 5 MIN PRN
Status: DISCONTINUED | OUTPATIENT
Start: 2019-03-01 | End: 2019-03-01 | Stop reason: HOSPADM

## 2019-03-01 RX ORDER — HYDROMORPHONE HYDROCHLORIDE 2 MG/1
2-4 TABLET ORAL
Status: DISCONTINUED | OUTPATIENT
Start: 2019-03-01 | End: 2019-03-02

## 2019-03-01 RX ORDER — ACETAMINOPHEN 325 MG/1
975 TABLET ORAL EVERY 8 HOURS
Status: DISCONTINUED | OUTPATIENT
Start: 2019-03-01 | End: 2019-03-01

## 2019-03-01 RX ADMIN — GABAPENTIN 300 MG: 300 CAPSULE ORAL at 21:11

## 2019-03-01 RX ADMIN — Medication 30 MG: at 11:49

## 2019-03-01 RX ADMIN — SODIUM CHLORIDE, POTASSIUM CHLORIDE, SODIUM LACTATE AND CALCIUM CHLORIDE: 600; 310; 30; 20 INJECTION, SOLUTION INTRAVENOUS at 17:29

## 2019-03-01 RX ADMIN — DEXMEDETOMIDINE HYDROCHLORIDE 0.4 MCG/KG/HR: 100 INJECTION, SOLUTION INTRAVENOUS at 11:20

## 2019-03-01 RX ADMIN — Medication: at 16:24

## 2019-03-01 RX ADMIN — FENTANYL CITRATE 100 MCG: 50 INJECTION, SOLUTION INTRAMUSCULAR; INTRAVENOUS at 13:15

## 2019-03-01 RX ADMIN — FENTANYL CITRATE 50 MCG: 50 INJECTION, SOLUTION INTRAMUSCULAR; INTRAVENOUS at 12:12

## 2019-03-01 RX ADMIN — PROPOFOL 50 MG: 10 INJECTION, EMULSION INTRAVENOUS at 13:15

## 2019-03-01 RX ADMIN — HYDROMORPHONE HYDROCHLORIDE 1 MG: 1 INJECTION, SOLUTION INTRAMUSCULAR; INTRAVENOUS; SUBCUTANEOUS at 14:49

## 2019-03-01 RX ADMIN — ATORVASTATIN CALCIUM 80 MG: 40 TABLET, FILM COATED ORAL at 21:11

## 2019-03-01 RX ADMIN — HYDROMORPHONE HYDROCHLORIDE 0.5 MG: 1 INJECTION, SOLUTION INTRAMUSCULAR; INTRAVENOUS; SUBCUTANEOUS at 17:37

## 2019-03-01 RX ADMIN — FENTANYL CITRATE 100 MCG: 50 INJECTION, SOLUTION INTRAMUSCULAR; INTRAVENOUS at 09:34

## 2019-03-01 RX ADMIN — ACETAMINOPHEN 975 MG: 325 TABLET, FILM COATED ORAL at 21:11

## 2019-03-01 RX ADMIN — ROCURONIUM BROMIDE 20 MG: 10 INJECTION INTRAVENOUS at 11:51

## 2019-03-01 RX ADMIN — PHENYLEPHRINE HYDROCHLORIDE 100 MCG: 10 INJECTION, SOLUTION INTRAMUSCULAR; INTRAVENOUS; SUBCUTANEOUS at 11:34

## 2019-03-01 RX ADMIN — Medication 20 MG: at 12:12

## 2019-03-01 RX ADMIN — DEXAMETHASONE SODIUM PHOSPHATE 4 MG: 4 INJECTION, SOLUTION INTRA-ARTICULAR; INTRALESIONAL; INTRAMUSCULAR; INTRAVENOUS; SOFT TISSUE at 11:10

## 2019-03-01 RX ADMIN — HYDROMORPHONE HYDROCHLORIDE 0.5 MG: 1 INJECTION, SOLUTION INTRAMUSCULAR; INTRAVENOUS; SUBCUTANEOUS at 15:17

## 2019-03-01 RX ADMIN — CEFAZOLIN SODIUM 2 G: 2 INJECTION, SOLUTION INTRAVENOUS at 21:11

## 2019-03-01 RX ADMIN — ONDANSETRON 4 MG: 2 INJECTION INTRAMUSCULAR; INTRAVENOUS at 09:24

## 2019-03-01 RX ADMIN — ROCURONIUM BROMIDE 10 MG: 10 INJECTION INTRAVENOUS at 14:25

## 2019-03-01 RX ADMIN — FENTANYL CITRATE 100 MCG: 50 INJECTION, SOLUTION INTRAMUSCULAR; INTRAVENOUS at 09:24

## 2019-03-01 RX ADMIN — ROCURONIUM BROMIDE 10 MG: 10 INJECTION INTRAVENOUS at 13:49

## 2019-03-01 RX ADMIN — FENTANYL CITRATE 50 MCG: 50 INJECTION, SOLUTION INTRAMUSCULAR; INTRAVENOUS at 16:51

## 2019-03-01 RX ADMIN — PHENYLEPHRINE HYDROCHLORIDE 100 MCG: 10 INJECTION, SOLUTION INTRAMUSCULAR; INTRAVENOUS; SUBCUTANEOUS at 13:48

## 2019-03-01 RX ADMIN — CEFAZOLIN 1 G: 1 INJECTION, POWDER, FOR SOLUTION INTRAMUSCULAR; INTRAVENOUS at 13:06

## 2019-03-01 RX ADMIN — SODIUM CHLORIDE, POTASSIUM CHLORIDE, SODIUM LACTATE AND CALCIUM CHLORIDE: 600; 310; 30; 20 INJECTION, SOLUTION INTRAVENOUS at 14:45

## 2019-03-01 RX ADMIN — MIDAZOLAM 2 MG: 1 INJECTION INTRAMUSCULAR; INTRAVENOUS at 11:06

## 2019-03-01 RX ADMIN — PROPOFOL 200 MG: 10 INJECTION, EMULSION INTRAVENOUS at 11:10

## 2019-03-01 RX ADMIN — SODIUM CHLORIDE, POTASSIUM CHLORIDE, SODIUM LACTATE AND CALCIUM CHLORIDE: 600; 310; 30; 20 INJECTION, SOLUTION INTRAVENOUS at 11:41

## 2019-03-01 RX ADMIN — PHENYLEPHRINE HYDROCHLORIDE 100 MCG: 10 INJECTION, SOLUTION INTRAMUSCULAR; INTRAVENOUS; SUBCUTANEOUS at 11:37

## 2019-03-01 RX ADMIN — FENTANYL CITRATE 200 MCG: 50 INJECTION, SOLUTION INTRAMUSCULAR; INTRAVENOUS at 11:10

## 2019-03-01 RX ADMIN — PHENYLEPHRINE HYDROCHLORIDE 100 MCG: 10 INJECTION, SOLUTION INTRAMUSCULAR; INTRAVENOUS; SUBCUTANEOUS at 11:41

## 2019-03-01 RX ADMIN — SODIUM CHLORIDE, POTASSIUM CHLORIDE, SODIUM LACTATE AND CALCIUM CHLORIDE: 600; 310; 30; 20 INJECTION, SOLUTION INTRAVENOUS at 11:06

## 2019-03-01 RX ADMIN — HYDROMORPHONE HYDROCHLORIDE 0.5 MG: 1 INJECTION, SOLUTION INTRAMUSCULAR; INTRAVENOUS; SUBCUTANEOUS at 15:21

## 2019-03-01 RX ADMIN — ROCURONIUM BROMIDE 20 MG: 10 INJECTION INTRAVENOUS at 13:15

## 2019-03-01 RX ADMIN — CEFAZOLIN SODIUM 2 G: 2 INJECTION, SOLUTION INTRAVENOUS at 11:06

## 2019-03-01 RX ADMIN — ROCURONIUM BROMIDE 50 MG: 10 INJECTION INTRAVENOUS at 11:10

## 2019-03-01 RX ADMIN — POTASSIUM CHLORIDE AND SODIUM CHLORIDE: 900; 150 INJECTION, SOLUTION INTRAVENOUS at 21:06

## 2019-03-01 RX ADMIN — PHENYLEPHRINE HYDROCHLORIDE 100 MCG: 10 INJECTION, SOLUTION INTRAMUSCULAR; INTRAVENOUS; SUBCUTANEOUS at 13:38

## 2019-03-01 RX ADMIN — PHENYLEPHRINE HYDROCHLORIDE 100 MCG: 10 INJECTION, SOLUTION INTRAMUSCULAR; INTRAVENOUS; SUBCUTANEOUS at 14:31

## 2019-03-01 RX ADMIN — ONDANSETRON 4 MG: 2 INJECTION INTRAMUSCULAR; INTRAVENOUS at 15:06

## 2019-03-01 RX ADMIN — FENTANYL CITRATE 100 MCG: 50 INJECTION, SOLUTION INTRAMUSCULAR; INTRAVENOUS at 14:11

## 2019-03-01 RX ADMIN — PHENYLEPHRINE HYDROCHLORIDE 200 MCG: 10 INJECTION, SOLUTION INTRAMUSCULAR; INTRAVENOUS; SUBCUTANEOUS at 12:35

## 2019-03-01 RX ADMIN — LIDOCAINE HYDROCHLORIDE 50 MG: 10 INJECTION, SOLUTION INFILTRATION; PERINEURAL at 11:10

## 2019-03-01 ASSESSMENT — MIFFLIN-ST. JEOR: SCORE: 1884.6

## 2019-03-01 ASSESSMENT — ACTIVITIES OF DAILY LIVING (ADL): ADLS_ACUITY_SCORE: 17

## 2019-03-01 ASSESSMENT — COPD QUESTIONNAIRES: COPD: 0

## 2019-03-01 NOTE — OP NOTE
OPERATIVE NOTE      Pre op Diagnosis:   1. Previous right L5-S1 microdiscectomy with resection of synovial cyst by Dr. Ragland  In 2011  2. Severe bilateral foraminal stenosis at L5-S1 right greater than left and possible some residual synovial cyst versus herniated disc versus hypertrophied ligament  3. Low back pain and RLE radicular pain        Post op Diagnosis: Same    Procedure: Redo L5-S1 decompression with L5-S1 TLIF  1. Reopening of previous incision with L5-S1 decompressive laminectomy with right facetectomy and decompression of roots  2. L5-S1 complete discectomy with arthrodesis and placement of a 9-13 x 28 mm Medtronic Elevate expandable interbody cage with locally harvested autologous bone placed centrally and anterior to the graft  3. Placement of Medtronic Solera pedicle screws from L5-S1 bilaterally   4. L5-S1 posterior lateral fusion with locally harvested autologous bone    5. Use of Stealth and O-arm and intraoperative microscope       Surgeon: Tereso Woodruff MD, MS, FAANS    Assistant: Adolfo Last    Indication for procedure: The patient is a 58 year old male that presented to clinic with recurrent low back and RLE pain. He had a previous right L5-S1 laminectomy, microdiscectomy and resection of synovial cyst by Dr. Ragland in 2011. After reviewing the patient's imaging studies and examination, the decision was made to proceed with the above procedure. The patient understood the risks and benefits of surgery and wanted to proceed.    Description of Procedure: The patient was seen in the pre op area and the procedure was discussed with him once again and all questions were answered. The consent was then signed and the patients lumbar spine spine was marked with a marker. The patient was then transferred to the operating suite on a stretcher and received general endotracheal anesthesia and a anderson catheter placed and he was placed on the operating room table in the prone position with  all pressure points padded. The portable Xray was brought in the operating room for localization of the L5-S1 levels. The back was then prepped and draped in a normal sterile fashion and the planned incision was marked and injected with local anesthesia. The incision was then reopened from L5-S1 and dissection continued down through the subcutaneous tissue to the fascia. The fascia was then reopened and extended to the L5-S1 interspaces with the Bovie cautery. Subperiosteal dissection was used to expose the spinous processes, bilateral lamina facet joints and transverse processes from L5-S1. The retractors were then placed and the exposure was complete and levels were verified. The Stealth and O-arm were then brought in the operating room and the reference frame was placed on the spinous process of L4. The O-arm was then brought into the field and the images were taken from L5-S1 and transferred to the Stealth navigation system. Attention was then turned to the pedicle screw placement where the pedicle screws were placed from L5 to S1 using the normal technique of making a  hole with the Midas uli drill under navigation, the navigated thoracic probe was used to penetrate the pedicles and the pedicle screws were placed down the pedicles of L5 and S1 bilaterally under navigation. Following that the attention then turned to the decompression where a redo L5-S1 laminectomy with right facetectomy was performed using the Midas Uli drill, Kerrison rongeurs, pituitary rongeurs to expose the exiting L5 and traversing S1 roots in Kambin's triangle. The L5-S1 disk was removed from the spinal canal and the roots were noted to be free by palpation with the Kaur ball hook. The size 7-9 mm endplate jaren and pituitary rongeurs were used to remove the disk at L5-S1 and to clean the endplates. The funnel was then used to place locally harvested autologous bone in the L5-S1 interspace.  Next, the Medtronic Elevate interbody  expandable cage was placed in the L5-S1 interspace and expanded to the appropriate size.       The wound was then irrigated with saline and hemostasis was obtained with bipolar cautery, gelfoam and Surgiflo. The connecting rods were then placed in the screws heads from L5-S1 and the locking caps were the placed and secured with the counter torque system. Decortication of the lateral facet joints and transverse processes was performed from L5-S1 with the Midas Uli drill and locally harvested autologous bone was placed for the posterior lateral fusion after another round of irrigation with saline. Two AARTI drains were placed subfascially and Vancomycin powder was then placed in the wound subfascially and the fascia was closed with 0 vicryl. Additional Vancomycin powder was then placed in the suprafascial wound and the subcutaneous tissue was closed with 2-0 and 3-0 vicryl and the skin was closed with staples. The wound was then dressed appropriately and the patient was transferred back the the stretch, extubated and sent to the recovery room.      At the end of the case all counts were correct      Complications -  none      EBL - 100 ml    IV fluid - please see Anesthesia report      The patient received Ancef preoperatively and Vancomycin in the infra and suprafascial wound        Tereso Woodruff MD, MS, FAANS   No complaints

## 2019-03-01 NOTE — PROGRESS NOTES
SPIRITUAL HEALTH SERVICES  [facility]  PRE-SURGERY VISIT    Pre-surgical visit with pt.  Provided spiritual support, prayer.       Elpidio Ring MA  Staff   Pager: [phone]  Phone: [phone]

## 2019-03-01 NOTE — ANESTHESIA POSTPROCEDURE EVALUATION
Patient: Luis Godinez    Procedure(s):  redo right L5-S1 transforaminal lumbar interbody fusion    Diagnosis:severe right foraminal stenosis with inretractable right LE pain with previous right L5-S1 resection of synovial cyst  Diagnosis Additional Information: Pre op Diagnosis:   1. Previous right L5-S1 microdiscectomy with resection of synovial cyst by Dr. Ragland  In 2011  2. Severe bilateral foraminal stenosis at L5-S1 right greater than left and possible some residual synovial cyst versus herniated d, isc versus hypertrophied ligament  3. Low back pain and RLE radicular pain           Post op Diagnosis: Same    Procedure: Redo L5-S1 decompression with L5-S1 TLIF  1. Reopening of previous incision with L5-S1 decompressive laminectomy with right fac, etectomy and decompression of roots  2. L5-S1 complete discectomy with arthrodesis and placement of a 9-13 x 28 mm Medtronic Elevate expandable interbody cage with locally harvested autologous bone placed centrally and anterior to the graft  3. Place, ment of Medtronic Solera pedicle screws from L5-S1 bilaterally   4. L5-S1 posterior lateral fusion with locally harvested autologous bone    5. Use of Stealth and O-arm and intraoperative microscope            Anesthesia Type:  General, ETT    Note:  Anesthesia Post Evaluation    Patient location during evaluation: PACU  Patient participation: Able to fully participate in evaluation  Level of consciousness: awake  Pain management: adequate  Airway patency: patent  Cardiovascular status: acceptable  Respiratory status: acceptable  Hydration status: euvolemic  PONV: controlled     Anesthetic complications: None          Last vitals:  Vitals:    03/01/19 1715 03/01/19 1730 03/01/19 1745   BP: 99/82 113/82 100/69   Pulse: 77 80 72   Resp: 15 18 19   Temp:      SpO2: 96% 97% 97%         Electronically Signed By: Cristian Rosas MD  March 1, 2019  5:55 PM

## 2019-03-01 NOTE — ANESTHESIA PREPROCEDURE EVALUATION
Anesthesia Pre-Procedure Evaluation    Patient: Luis Godinez   MRN: [id number] : 1960          Preoperative Diagnosis: severe right foraminal stenosis with inretractable right LE pain with previous right L5-S1 resection of synovial cyst    Procedure(s):  redo right L5-S1 transforaminal lumbar interbody fusion    Past Medical History:   Diagnosis Date     Arthritis     back, legs, knees     CAD (coronary artery disease)      Chronic low back pain      CKD (chronic kidney disease)      CVA (cerebral vascular accident) (H)     When using drugs, crack cocaine (Age 26)  just left side facial paralysis     Depression      Diabetes (H)      GERD (gastroesophageal reflux disease)      h/o Cocaine abuse      Hypertension      SHANEL (obstructive sleep apnea)     uses cpap     Other chronic pain     back     Prostate cancer (H)      Pulmonary emboli (H)      Pulmonary embolism (H) 2018     Vitamin D deficiency      Past Surgical History:   Procedure Laterality Date      Robotic Radical Prostatectomy   2013     BACK SURGERY      low back surgery      LUMBAR LAMINECTOMY NOS  2012     Anesthesia Evaluation     .             ROS/MED HX    ENT/Pulmonary:     (+)sleep apnea, doesn't use CPAP , . .   (-) COPD   Neurologic:     (+)TIA     Cardiovascular:     (+) Dyslipidemia, hypertension--CAD, --. : . . . :. . Previous cardiac testing Echodate: - normalresults:date: results: date: results: date: results:         (-) angina, past MI, pacemaker, pulmonary hypertension, stent, pacemaker, angina, past MI and CABG   METS/Exercise Tolerance:     Hematologic:         Musculoskeletal:         GI/Hepatic:     (+) GERD      (-) hepatitis   Renal/Genitourinary:     (+) chronic renal disease, type: CRI,    (-) BPH   Endo:     (+) type II DM Obesity, .   (-) Type I DM, thyroid disease, chronic steroid usage and other endocrine disorder   Psychiatric: Comment: Hx Polysubstance Abuse    (+) psychiatric history depression  "     Infectious Disease:         Malignancy:         Other:                          Physical Exam      Airway   Mallampati: II  TM distance: >3 FB  Neck ROM: full    Dental     Cardiovascular   Rhythm and rate: regular and normal  (-) no murmur    Pulmonary    breath sounds clear to auscultation    Other findings: Lab Test        09/13/18                       0557          WBC          5.3           HGB          13.5          MCV          89            PLT          204           INR          1.04           Lab Test        09/13/18                       0557          NA           144           POTASSIUM    3.9           CHLORIDE     109           CO2          27            BUN          18            CR           1.38*         ANIONGAP     8             MARINE          10.5*         GLC          118*                Lab Results   Component Value Date    WBC 5.3 09/13/2018    HGB 13.5 09/13/2018    HCT 42.4 09/13/2018     09/13/2018     09/13/2018    POTASSIUM 3.9 09/13/2018    CHLORIDE 109 09/13/2018    CO2 27 09/13/2018    BUN 18 09/13/2018    CR 1.38 (H) 09/13/2018     (H) 09/13/2018    MARINE 10.5 (H) 09/13/2018    ALBUMIN 3.7 09/13/2018    PROTTOTAL 7.7 09/13/2018    ALT 67 09/13/2018    AST 45 09/13/2018    ALKPHOS 94 09/13/2018    BILITOTAL 0.2 09/13/2018    LIPASE 217 09/13/2018    INR 1.04 09/13/2018       Preop Vitals  BP Readings from Last 3 Encounters:   03/01/19 (!) 144/100   02/06/19 136/82   01/17/19 118/76    Pulse Readings from Last 3 Encounters:   03/01/19 73   02/06/19 86   01/17/19 100      Resp Readings from Last 3 Encounters:   01/17/19 14   09/19/18 14   09/13/18 20    SpO2 Readings from Last 3 Encounters:   03/01/19 96%   02/06/19 96%   01/17/19 95%      Temp Readings from Last 1 Encounters:   03/01/19 98.2  F (36.8  C) (Temporal)    Ht Readings from Last 1 Encounters:   03/01/19 1.702 m (5' 7.01\")      Wt Readings from Last 1 Encounters:   03/01/19 111.6 kg (246 lb)    Estimated " "body mass index is 38.52 kg/m  as calculated from the following:    Height as of this encounter: 1.702 m (5' 7.01\").    Weight as of this encounter: 111.6 kg (246 lb).       Anesthesia Plan      History & Physical Review  History and physical reviewed and following examination; no interval change.    ASA Status:  3 .    NPO Status:  > 8 hours    Plan for General and ETT with Propofol induction. Maintenance will be Balanced.    PONV prophylaxis:  Ondansetron (or other 5HT-3)  Precedex Gtt please    Patient very uncomfortable. Requests medication for pain.      Postoperative Care  Postoperative pain management:  IV analgesics and Oral pain medications.      Consents  Anesthetic plan, risks, benefits and alternatives discussed with:  Patient.  Use of blood products discussed: Yes.   Use of blood products discussed with Patient.  Consented to blood products.  .                 Checo Caicedo MD                    .  "

## 2019-03-01 NOTE — OR NURSING
Patient states he has had some right sided pain for the past 3 days.  He has no fever or urine symptoms.  He denies any SOB or heart pains.  He has not told anyone about this until now.  DR. Woodruff and Dr. Rosas aware.

## 2019-03-01 NOTE — PROGRESS NOTES
RT- Patient set up on hospital CPAP +10 with 4L oxygen bled in. Patient not able to communicate what home settings are, tolerating +10 well at this time.

## 2019-03-01 NOTE — ANESTHESIA CARE TRANSFER NOTE
Patient: Luis Godinez    Procedure(s):  redo right L5-S1 transforaminal lumbar interbody fusion    Diagnosis: severe right foraminal stenosis with inretractable right LE pain with previous right L5-S1 resection of synovial cyst  Diagnosis Additional Information: No value filed.    Anesthesia Type:   General, ETT     Note:  Airway :Face Mask  Patient transferred to:PACU  Handoff Report: Identifed the Patient, Identified the Reponsible Provider, Reviewed the pertinent medical history, Discussed the surgical course, Reviewed Intra-OP anesthesia mangement and issues during anesthesia, Set expectations for post-procedure period and Allowed opportunity for questions and acknowledgement of understanding      Vitals: (Last set prior to Anesthesia Care Transfer)    CRNA VITALS  3/1/2019 1528 - 3/1/2019 1558      3/1/2019             Pulse:  88    SpO2:  99 %                Electronically Signed By: Dean Dennis Severson, APRN CRNA  March 1, 2019  3:58 PM

## 2019-03-02 ENCOUNTER — APPOINTMENT (OUTPATIENT)
Dept: PHYSICAL THERAPY | Facility: CLINIC | Age: 59
End: 2019-03-02
Attending: NEUROLOGICAL SURGERY
Payer: COMMERCIAL

## 2019-03-02 LAB
GLUCOSE BLDC GLUCOMTR-MCNC: 104 MG/DL (ref 70–99)
GLUCOSE BLDC GLUCOMTR-MCNC: 111 MG/DL (ref 70–99)
GLUCOSE BLDC GLUCOMTR-MCNC: 164 MG/DL (ref 70–99)
GLUCOSE BLDC GLUCOMTR-MCNC: 175 MG/DL (ref 70–99)
GLUCOSE BLDC GLUCOMTR-MCNC: 95 MG/DL (ref 70–99)

## 2019-03-02 PROCEDURE — 25000128 H RX IP 250 OP 636: Performed by: NEUROLOGICAL SURGERY

## 2019-03-02 PROCEDURE — 99207 ZZC CONSULT E&M CHANGED TO INITIAL LEVEL: CPT | Performed by: INTERNAL MEDICINE

## 2019-03-02 PROCEDURE — L0625 LO FLEX L1-BELOW L5 PRE OTS: HCPCS

## 2019-03-02 PROCEDURE — 25000132 ZZH RX MED GY IP 250 OP 250 PS 637: Performed by: NEUROLOGICAL SURGERY

## 2019-03-02 PROCEDURE — 25000128 H RX IP 250 OP 636: Performed by: NURSE PRACTITIONER

## 2019-03-02 PROCEDURE — 25000132 ZZH RX MED GY IP 250 OP 250 PS 637: Performed by: PHYSICIAN ASSISTANT

## 2019-03-02 PROCEDURE — 94660 CPAP INITIATION&MGMT: CPT

## 2019-03-02 PROCEDURE — 97530 THERAPEUTIC ACTIVITIES: CPT | Mod: GP | Performed by: PHYSICAL THERAPIST

## 2019-03-02 PROCEDURE — 97116 GAIT TRAINING THERAPY: CPT | Mod: GP | Performed by: PHYSICAL THERAPIST

## 2019-03-02 PROCEDURE — 00000146 ZZHCL STATISTIC GLUCOSE BY METER IP

## 2019-03-02 PROCEDURE — 97161 PT EVAL LOW COMPLEX 20 MIN: CPT | Mod: GP | Performed by: PHYSICAL THERAPIST

## 2019-03-02 PROCEDURE — 25000132 ZZH RX MED GY IP 250 OP 250 PS 637: Performed by: NURSE PRACTITIONER

## 2019-03-02 PROCEDURE — 12000000 ZZH R&B MED SURG/OB

## 2019-03-02 PROCEDURE — 99222 1ST HOSP IP/OBS MODERATE 55: CPT | Performed by: INTERNAL MEDICINE

## 2019-03-02 RX ORDER — POLYETHYLENE GLYCOL 3350 17 G/17G
17 POWDER, FOR SOLUTION ORAL DAILY
Status: DISCONTINUED | OUTPATIENT
Start: 2019-03-02 | End: 2019-03-06 | Stop reason: HOSPADM

## 2019-03-02 RX ORDER — DEXTROSE MONOHYDRATE 25 G/50ML
25-50 INJECTION, SOLUTION INTRAVENOUS
Status: DISCONTINUED | OUTPATIENT
Start: 2019-03-02 | End: 2019-03-06 | Stop reason: HOSPADM

## 2019-03-02 RX ORDER — CALCIUM CARBONATE 500 MG/1
1000 TABLET, CHEWABLE ORAL
Status: DISCONTINUED | OUTPATIENT
Start: 2019-03-02 | End: 2019-03-06 | Stop reason: HOSPADM

## 2019-03-02 RX ORDER — DEXAMETHASONE SODIUM PHOSPHATE 4 MG/ML
10 INJECTION, SOLUTION INTRA-ARTICULAR; INTRALESIONAL; INTRAMUSCULAR; INTRAVENOUS; SOFT TISSUE ONCE
Status: COMPLETED | OUTPATIENT
Start: 2019-03-02 | End: 2019-03-02

## 2019-03-02 RX ORDER — NICOTINE POLACRILEX 4 MG
15-30 LOZENGE BUCCAL
Status: DISCONTINUED | OUTPATIENT
Start: 2019-03-02 | End: 2019-03-06 | Stop reason: HOSPADM

## 2019-03-02 RX ADMIN — CEFAZOLIN SODIUM 2 G: 2 INJECTION, SOLUTION INTRAVENOUS at 04:08

## 2019-03-02 RX ADMIN — OXYCODONE HYDROCHLORIDE 10 MG: 5 TABLET ORAL at 11:26

## 2019-03-02 RX ADMIN — OMEPRAZOLE 40 MG: 20 CAPSULE, DELAYED RELEASE ORAL at 09:49

## 2019-03-02 RX ADMIN — ACETAMINOPHEN 975 MG: 325 TABLET, FILM COATED ORAL at 20:12

## 2019-03-02 RX ADMIN — CALCIUM CARBONATE (ANTACID) CHEW TAB 500 MG 1000 MG: 500 CHEW TAB at 00:39

## 2019-03-02 RX ADMIN — GABAPENTIN 300 MG: 300 CAPSULE ORAL at 09:57

## 2019-03-02 RX ADMIN — DEXAMETHASONE SODIUM PHOSPHATE 10 MG: 4 INJECTION, SOLUTION INTRAMUSCULAR; INTRAVENOUS at 13:37

## 2019-03-02 RX ADMIN — ATORVASTATIN CALCIUM 80 MG: 40 TABLET, FILM COATED ORAL at 09:57

## 2019-03-02 RX ADMIN — ACETAMINOPHEN 975 MG: 325 TABLET, FILM COATED ORAL at 11:27

## 2019-03-02 RX ADMIN — POLYETHYLENE GLYCOL 3350 17 G: 17 POWDER, FOR SOLUTION ORAL at 09:59

## 2019-03-02 RX ADMIN — OXYCODONE HYDROCHLORIDE 10 MG: 5 TABLET ORAL at 16:58

## 2019-03-02 RX ADMIN — CEFAZOLIN SODIUM 2 G: 2 INJECTION, SOLUTION INTRAVENOUS at 13:55

## 2019-03-02 RX ADMIN — DOCUSATE SODIUM 100 MG: 100 CAPSULE, LIQUID FILLED ORAL at 20:17

## 2019-03-02 RX ADMIN — DOCUSATE SODIUM 100 MG: 100 CAPSULE, LIQUID FILLED ORAL at 09:58

## 2019-03-02 RX ADMIN — GABAPENTIN 300 MG: 300 CAPSULE ORAL at 13:55

## 2019-03-02 RX ADMIN — ACETAMINOPHEN 975 MG: 325 TABLET, FILM COATED ORAL at 04:09

## 2019-03-02 RX ADMIN — GABAPENTIN 300 MG: 300 CAPSULE ORAL at 20:12

## 2019-03-02 RX ADMIN — CEFAZOLIN SODIUM 2 G: 2 INJECTION, SOLUTION INTRAVENOUS at 20:10

## 2019-03-02 RX ADMIN — HYDROCHLOROTHIAZIDE 25 MG: 25 TABLET ORAL at 09:58

## 2019-03-02 ASSESSMENT — ACTIVITIES OF DAILY LIVING (ADL)
ADLS_ACUITY_SCORE: 15
ADLS_ACUITY_SCORE: 16
ADLS_ACUITY_SCORE: 24
ADLS_ACUITY_SCORE: 24
ADLS_ACUITY_SCORE: 17
ADLS_ACUITY_SCORE: 16

## 2019-03-02 NOTE — PROGRESS NOTES
03/02/19 0800   Quick Adds   Type of Visit Initial PT Evaluation   Living Environment   Lives With alone   Living Arrangements other (see comments)  (2-level duplex)   Home Accessibility stairs to enter home;stairs within home   Number of Stairs, Main Entrance three   Number of Stairs, Within Home, Primary other (see comments)  (13 steps to bathroom, main floor bedroom/living)   Self-Care   Usual Activity Tolerance moderate   Current Activity Tolerance fair   Regular Exercise No   Equipment Currently Used at Home none   Functional Level Prior   Ambulation 0-->independent   Transferring 0-->independent   Toileting 0-->independent   Bathing 0-->independent   Communication 0-->understands/communicates without difficulty   Fall history within last six months no   General Information   Onset of Illness/Injury or Date of Surgery - Date 03/01/19   Referring Physician DR. Woodruff   Patient/Family Goals Statement return home, decreased pain   Pertinent History of Current Problem (include personal factors and/or comorbidities that impact the POC) Pt is POD#1 s/p lumbar fusion.  Pt had previous right L5-S1 laminectomy, microdiscectomy and resection of synovial cyst by Dr. Ragland in 2011. PMHx sig for DM   Precautions/Limitations spinal precautions   Weight-Bearing Status - LLE full weight-bearing   Weight-Bearing Status - RLE full weight-bearing   General Observations supine in bed, agreeable to PT   Cognitive Status Examination   Orientation orientation to person, place and time   Level of Consciousness alert   Follows Commands and Answers Questions able to follow multistep instructions   Personal Safety and Judgment intact   Memory intact   Pain Assessment   Patient Currently in Pain Yes, see Vital Sign flowsheet  (rates pain 6/10)   Range of Motion (ROM)   ROM Comment LE ROM WFL, spine ROM limited post-op   Strength   Strength Comments General strength WFL   Bed Mobility   Bed Mobility Comments Pt with assist x1 able  "to transfer supine<sit EOB with verbal cueing and Keila   Transfer Skills   Transfer Comments sit<>stand with SB-Keila x2   Gait   Gait Comments Pt took steps bed to chair, SBA x1 along with 1 to manage lines   Balance   Balance Comments Impaired, needing UE support with FWW   Sensory Examination   Sensory Perception Comments Describes numbness in hands bilat and R LE   Modality Interventions   Planned Modality Interventions Cryotherapy   General Therapy Interventions   Planned Therapy Interventions bed mobility training;gait training;progressive activity/exercise   Clinical Impression   Criteria for Skilled Therapeutic Intervention yes, treatment indicated   PT Diagnosis Impaired mobility    Influenced by the following impairments strength, pain, activity tolerance   Functional limitations due to impairments bed mobility, transfers, gait, stairs   Clinical Presentation Stable/Uncomplicated   Clinical Presentation Rationale chart review, PT eval   Clinical Decision Making (Complexity) Low complexity   Therapy Frequency` 2 times/day   Predicted Duration of Therapy Intervention (days/wks) 2-3 days   Anticipated Discharge Disposition Transitional Care Facility   Risk & Benefits of therapy have been explained Yes   Patient, Family & other staff in agreement with plan of care Yes   [facility] Perfect [employer] TM \"6 Clicks\"   2016, Trustees of [facility], under license to [employer].  All rights reserved.   6 Clicks Short Forms Basic Mobility Inpatient Short Form   [facility] AM-PAC  \"6 Clicks\" V.2 Basic Mobility Inpatient Short Form   1. Turning from your back to your side while in a flat bed without using bedrails? 3 - A Little   2. Moving from lying on your back to sitting on the side of a flat bed without using bedrails? 3 - A Little   3. Moving to and from a bed to a chair (including a wheelchair)? 3 - A Little   4. Standing up from a chair using your arms (e.g., wheelchair, or bedside chair)? 3 - A " Little   5. To walk in hospital room? 3 - A Little   6. Climbing 3-5 steps with a railing? 2 - A Lot   Basic Mobility Raw Score (Score out of 24.Lower scores equate to lower levels of function) 17   Total Evaluation Time   Total Evaluation Time (Minutes) 15

## 2019-03-02 NOTE — CONSULTS
[facility]    Hospitalist Consultation    Date of Admission:  3/1/2019    Assessment & Plan   Luis Godinez is a 58 year old male who was admitted on 3/1/2019. I was asked to see the patient for postop assessment of medical issues.    Patient was admitted by neurosurgery team for an elective spine surgery.  He has severe bilateral foraminal stenosis of L5-S1.  He underwent Redo L5-S1 decompression with L5-S1 TLIF.    He underwent procedure under general anesthesia.  Estimated blood loss documented to be 100 cc.    I evaluated the patient postoperatively.  Overall he feels well.  Is experiencing mild swelling of his hands.    Problem list:    Status post L5-S1 decompression with T LIF.  -Management per spine surgery.  -DVT prophylaxis per spine surgery.    Hypertension  - Continue HCTZ.  Lisinopril with holding parameters.    Diabetes  - Hold oral hypoglycemic medications.  -Keep on insulin sliding scale.    Gastroesophageal reflux disease  -Complaining of reflux symptoms.  Continue home omeprazole which works for him.    Dyslipidemia  -Continue atorvastatin.    Diabetic neuropathy  - Continue home gabapentin.    Many thanks for letting us participate in this patient's care.  We will follow along, thank you.    DVT Prophylaxis: Defer to primary service  Code Status: Full Code    Disposition: Per spine surgery.    Omi Rivas MD    Reason for Consult   Reason for consult: I was asked by Dr. Woodruff to evaluate this patient for postoperative management of medical issues.    Primary Care Physician   [facility]    Chief Complaint   Back pain.    History is obtained from the patient    History of Present Illness   Luis Godinez is a 58 year old male who presents with intractable back pain.  He was admitted by Dr. Woodruff from neurosurgery for a spine surgery.  Please see physical note for full details.    I evaluated the patient postoperatively.  Patient underwent the surgery under  general anesthesia.  Estimated blood loss of 100 cc.    Overall he is doing well.  He is complaining of his diabetic neuropathy.  We will continue his home gabapentin for that.    Otherwise the pain is adequately controlled.    Past Medical History    I have reviewed this patient's medical history and updated it with pertinent information if needed.   Past Medical History:   Diagnosis Date     Arthritis     back, legs, knees     CAD (coronary artery disease)      Chronic low back pain      CKD (chronic kidney disease)      CVA (cerebral vascular accident) (H)     When using drugs, crack cocaine (Age 26)  just left side facial paralysis     Depression      Diabetes (H)      GERD (gastroesophageal reflux disease)      h/o Cocaine abuse      Hypertension      SHANEL (obstructive sleep apnea)     uses cpap     Other chronic pain     back     Prostate cancer (H)      Pulmonary emboli (H)      Pulmonary embolism (H) 06/2018     Vitamin D deficiency        Past Surgical History   I have reviewed this patient's surgical history and updated it with pertinent information if needed.  Past Surgical History:   Procedure Laterality Date      Robotic Radical Prostatectomy   11/2013     BACK SURGERY      low back surgery 2011     LUMBAR LAMINECTOMY NOS  11/2012       Prior to Admission Medications   Prior to Admission Medications   Prescriptions Last Dose Informant Patient Reported? Taking?   ATORVASTATIN CALCIUM PO Past Week at Unknown time  Yes Yes   Sig: Take 80 mg by mouth daily    cyclobenzaprine (FLEXERIL) 10 MG tablet Past Week at Unknown time  No No   Sig: Take 1 tablet (10 mg) by mouth 3 times daily as needed   gabapentin (NEURONTIN) 300 MG capsule Past Week at Unknown time  Yes Yes   Sig: Take 300 mg by mouth 3 times daily   glipiZIDE (GLUCOTROL) 5 MG tablet Past Week at Unknown time  Yes Yes   Sig: Take 5 mg by mouth 2 times daily (before meals)   hydrochlorothiazide (HYDRODIURIL) 25 MG tablet Past Week at Unknown time  Yes  Yes   Sig: Take 25 mg by mouth daily   lisinopril (PRINIVIL/ZESTRIL) 20 MG tablet Past Week at Unknown time  Yes Yes   Sig: Take 20 mg by mouth daily   omeprazole (PRILOSEC) 40 MG DR capsule 2/28/2019 at Unknown time  No Yes   Sig: Take 1 capsule (40 mg) by mouth every morning      Facility-Administered Medications: None     Allergies   Allergies   Allergen Reactions     Morphine      Penicillins Rash       Social History   I have reviewed this patient's social history and updated it with pertinent information if needed. Luis Godinez  reports that  has never smoked. he has never used smokeless tobacco. He reports that he drinks alcohol. He reports that he uses drugs. Drug: Cocaine.  He says that he does cocaine every now and then but has not done it in the recent past.    Family History   I have reviewed this patient's family history and updated it with pertinent information if needed.   History reviewed. No pertinent family history.    Review of Systems   The 10 point Review of Systems is negative other than noted in the HPI or here.     Physical Exam   Temp: 98.3  F (36.8  C) Temp src: Temporal BP: 127/80 Pulse: 82 Heart Rate: 75 Resp: 14 SpO2: 95 % O2 Device: Nasal cannula Oxygen Delivery: 2 LPM  Vital Signs with Ranges  Temp:  [97.2  F (36.2  C)-98.5  F (36.9  C)] 98.3  F (36.8  C)  Pulse:  [69-90] 82  Heart Rate:  [66-83] 75  Resp:  [5-19] 14  BP: ()/(48-84) 127/80  SpO2:  [85 %-100 %] 95 %  246 lbs 0 oz    Constitutional: Awake, alert, cooperative, no apparent distress.  Eyes: Conjunctiva and pupils examined and normal.  HEENT: Moist mucous membranes, normal dentition.  Respiratory: Clear to auscultation bilaterally, no crackles or wheezing.  Cardiovascular: Regular rate and rhythm, normal S1 and S2, and no murmur noted.  GI: Soft, non-distended, non-tender, normal bowel sounds.  Lymph/Hematologic: No anterior cervical or supraclavicular adenopathy.  Skin: No rashes, no cyanosis, no  edema.  Musculoskeletal: No joint swelling, erythema or tenderness.  Neurologic: Fully alert and oriented.  Facial droop present-present at admission.  Psychiatric: Alert, oriented to person, place and time, no obvious anxiety or depression.    Data   -Data reviewed today: Medications.

## 2019-03-02 NOTE — PLAN OF CARE
Pt at baseline lives alone in a 2-level duplex with 13 steps to upper level where the only bathroom is, bedroom on main level.  Of note, per nsg pt is illiterate.    Discharge Planner PT   Patient plan for discharge: Pt plans on going to TCU prior to home alone  Current status: Pt needing Keila x2 to transfer supine<>sit<>stand and take 6 steps bed to chair using FWW.    Barriers to return to prior living situation: lives alone, 13 steps in home to bathroom, level of assist needed for mobility skills, limited activity tolerance  Recommendations for discharge: TCU  Rationale for recommendations: Pt will benefit from skilled PT during hospital stay and next level of care due to below baseline mobility skills and ADL's.  Pt has best chance at returning to indep living by going to TCU for further rehab.         Entered by: Ghada Lester 03/02/2019 12:32 PM

## 2019-03-02 NOTE — PROGRESS NOTES
[facility]    Neurosurgery Progress Note    Date of Service (when I saw the patient): 03/02/2019     Assessment & Plan   Luis Godinez is a 58 year old male who was admitted on 3/1/2019. The pt presented with lumbar radicular pain.  He had previous right L5-S1 microdisc and resection of synovial cyst with Dr. Ragland in 2011. He was found to have severe bilateral foraminal stenosis at L5-S1 right greater than left and possible some residual synovial cyst versus herniated disc versus hypertrophied ligament with low back pain and RLE radicular pain.  Today pt is sitting up in bed. He is noting numbness his fingers with full UE ROM and strength. He also notes incisional pain and numbness to his thighs. We will have him try IV Decadron.  He will work with therapies today.         Active Problems:    S/P lumbar fusion    Assessment: stable with pain     Plan: PT/OT and ambulation  Brace when out of bed   Wean off PCA and start oral analgesics   Encourage use of IS and deep breathing   Keep drains in until 30 cc or less output in one shift  Suture/Staple removal in 10-14 days   discontinue capnography     IV Decadron       I have discussed the following assessment and plan Dr. Tereso Woodruff  who is in agreement with initial plan and will follow up with further consultation recommendations.    Krissy Gibson [facility]  [facility]  [place] [address]    Tel [phone]  Pager [phone]      Interval History   Stable with pain     Physical Exam   Temp: 98.3  F (36.8  C) Temp src: Temporal BP: 111/57 Pulse: 82 Heart Rate: 75 Resp: 14 SpO2: 94 % O2 Device: Nasal cannula Oxygen Delivery: 2 LPM  Vitals:    02/12/19 1400 03/01/19 0751   Weight: 246 lb (111.6 kg) 246 lb (111.6 kg)     Vital Signs with Ranges  Temp:  [97.2  F (36.2  C)-98.5  F (36.9  C)] 98.3  F (36.8  C)  Pulse:  [69-90] 82  Heart Rate:  [66-83] 75  Resp:  [5-19] 14  BP:  "()/(48-86) 111/57  SpO2:  [85 %-100 %] 94 %  I/O last 3 completed shifts:  In: 3460 [P.O.:960; I.V.:2500]  Out: 2899 [Urine:2494; Drains:305; Blood:100]    Heart Rate: 75, Blood pressure 111/57, pulse 82, temperature 98.3  F (36.8  C), temperature source Temporal, resp. rate 14, height 5' 7.01\" (1.702 m), weight 246 lb (111.6 kg), SpO2 94 %.  246 lbs 0 oz  HEENT:  Normocephalic, atraumatic.  PERRLA.  EOM s intact.    Neck:  Supple, non-tender, without lymphadenopathy.  Heart:  No peripheral edema  Lungs:  No SOB  Abdomen:  Soft, non-tender, non-distended.   Skin:  Warm and dry, good capillary refill.  Extremities:  Good radial and dorsalis pedis pulses bilaterally, no edema, cyanosis or clubbing.    NEUROLOGICAL EXAMINATION:     Mental status:  Alert and Oriented x 3, speech is fluent.  Cranial nerves:  II-XII intact.   Motor:  Strength is 5/5 throughout the upper and lower extremities  Shoulder Abduction:  Right:  5/5   Left:  5/5  Biceps:                      Right:  5/5   Left:  5/5  Triceps:                     Right:  5/5   Left:  5/5  Wrist Extensors:       Right:  5/5   Left:  5/5  Wrist Flexors:           Right:  5/5   Left:  5/5  interosseus :            Right:  5/5   Left:  5/5   Hip Flexor:                Right: 5/5  Left:  5/5  Hip Adductor:             Right:  5/5  Left:  5/5  Hip Abductor:             Right:  5/5  Left:  5/5  Gastroc Soleus:        Right:  5/5  Left:  5/5  Tib/Ant:                      Right:  5/5  Left:  5/5  EHL:                     Right:  5/5  Left:  5/5  Sensation:  Intact      Medications     0.9% sodium chloride + KCl 20 mEq/L 75 mL/hr at 03/01/19 2106     HYDROmorphone         acetaminophen  975 mg Oral Q8H     atorvastatin  80 mg Oral Daily     ceFAZolin  2 g Intravenous Q8H     dexamethasone  10 mg Intravenous Once     docusate sodium  100 mg Oral BID     gabapentin  300 mg Oral TID     hydrochlorothiazide  25 mg Oral Daily     insulin aspart  1-3 Units Subcutaneous TID " AC     insulin aspart  1-3 Units Subcutaneous At Bedtime     lisinopril  20 mg Oral Daily     omeprazole  40 mg Oral QAM     polyethylene glycol  17 g Oral Daily     sodium chloride (PF)  3 mL Intracatheter Q8H       Data     All new lab and imaging data was personally reviewed by me.  CBC RESULTS:   Recent Labs   Lab Test 09/13/18  0557   WBC 5.3   RBC 4.77   HGB 13.5   HCT 42.4   MCV 89   MCH 28.3   MCHC 31.8   RDW 14.1        Basic Metabolic Panel:  Lab Results   Component Value Date     09/13/2018      Lab Results   Component Value Date    POTASSIUM 3.9 09/13/2018     Lab Results   Component Value Date    CHLORIDE 109 09/13/2018     Lab Results   Component Value Date    MARINE 10.5 09/13/2018     Lab Results   Component Value Date    CO2 27 09/13/2018     Lab Results   Component Value Date    BUN 18 09/13/2018     Lab Results   Component Value Date    CR 1.38 09/13/2018     Lab Results   Component Value Date     09/13/2018     INR:  Lab Results   Component Value Date    INR 1.04 09/13/2018

## 2019-03-02 NOTE — PLAN OF CARE
A&O x4. Forgetful at baseline. Soft BPs otherwise VSS on 2LPM of O2. LS CTA all fields. BS active x4. zeinab regular well. BG checks. Dressing to Spine CDI. Bilateral JPs with good output, see flowsheet. Reports baseline numbness to R toe. Have gotten mixed reports of bilateral hands having numbness before surgery but patiently currently reports this is new as well as numbness to his L thigh. Patient has strong  strength with good pulses and moderate D/P with good pulses bilaterally. Stood with A2 with walker, needs orthotic consult for brace. PCA dilaudid managing pain, voiding in good amts via anderson, removed this AM, due to void. Will continue to monitor.

## 2019-03-03 ENCOUNTER — APPOINTMENT (OUTPATIENT)
Dept: PHYSICAL THERAPY | Facility: CLINIC | Age: 59
End: 2019-03-03
Attending: NEUROLOGICAL SURGERY
Payer: COMMERCIAL

## 2019-03-03 LAB
GLUCOSE BLDC GLUCOMTR-MCNC: 106 MG/DL (ref 70–99)
GLUCOSE BLDC GLUCOMTR-MCNC: 119 MG/DL (ref 70–99)
GLUCOSE BLDC GLUCOMTR-MCNC: 152 MG/DL (ref 70–99)
GLUCOSE BLDC GLUCOMTR-MCNC: 171 MG/DL (ref 70–99)
GLUCOSE BLDC GLUCOMTR-MCNC: 172 MG/DL (ref 70–99)

## 2019-03-03 PROCEDURE — 99232 SBSQ HOSP IP/OBS MODERATE 35: CPT | Performed by: INTERNAL MEDICINE

## 2019-03-03 PROCEDURE — 00000146 ZZHCL STATISTIC GLUCOSE BY METER IP

## 2019-03-03 PROCEDURE — 25000132 ZZH RX MED GY IP 250 OP 250 PS 637: Performed by: NEUROLOGICAL SURGERY

## 2019-03-03 PROCEDURE — 40000275 ZZH STATISTIC RCP TIME EA 10 MIN

## 2019-03-03 PROCEDURE — 94660 CPAP INITIATION&MGMT: CPT

## 2019-03-03 PROCEDURE — 12000000 ZZH R&B MED SURG/OB

## 2019-03-03 PROCEDURE — 25000132 ZZH RX MED GY IP 250 OP 250 PS 637: Performed by: INTERNAL MEDICINE

## 2019-03-03 PROCEDURE — 97530 THERAPEUTIC ACTIVITIES: CPT | Mod: GP | Performed by: PHYSICAL THERAPIST

## 2019-03-03 PROCEDURE — 97116 GAIT TRAINING THERAPY: CPT | Mod: GP | Performed by: PHYSICAL THERAPIST

## 2019-03-03 PROCEDURE — 40000894 ZZH STATISTIC OT IP EVAL DEFER: Performed by: REHABILITATION PRACTITIONER

## 2019-03-03 PROCEDURE — 25000128 H RX IP 250 OP 636: Performed by: NEUROLOGICAL SURGERY

## 2019-03-03 PROCEDURE — 25000132 ZZH RX MED GY IP 250 OP 250 PS 637: Performed by: PHYSICIAN ASSISTANT

## 2019-03-03 PROCEDURE — 25000132 ZZH RX MED GY IP 250 OP 250 PS 637: Performed by: NURSE PRACTITIONER

## 2019-03-03 RX ORDER — BISACODYL 10 MG
10 SUPPOSITORY, RECTAL RECTAL DAILY PRN
Status: DISCONTINUED | OUTPATIENT
Start: 2019-03-03 | End: 2019-03-06 | Stop reason: HOSPADM

## 2019-03-03 RX ORDER — SENNOSIDES 8.6 MG
1 TABLET ORAL 2 TIMES DAILY
Status: DISCONTINUED | OUTPATIENT
Start: 2019-03-03 | End: 2019-03-04

## 2019-03-03 RX ADMIN — ACETAMINOPHEN 975 MG: 325 TABLET, FILM COATED ORAL at 19:59

## 2019-03-03 RX ADMIN — HYDROCHLOROTHIAZIDE 25 MG: 25 TABLET ORAL at 08:28

## 2019-03-03 RX ADMIN — OXYCODONE HYDROCHLORIDE 10 MG: 5 TABLET ORAL at 13:58

## 2019-03-03 RX ADMIN — OXYCODONE HYDROCHLORIDE 10 MG: 5 TABLET ORAL at 00:38

## 2019-03-03 RX ADMIN — ATORVASTATIN CALCIUM 80 MG: 40 TABLET, FILM COATED ORAL at 08:26

## 2019-03-03 RX ADMIN — CEFAZOLIN SODIUM 2 G: 2 INJECTION, SOLUTION INTRAVENOUS at 04:57

## 2019-03-03 RX ADMIN — ACETAMINOPHEN 975 MG: 325 TABLET, FILM COATED ORAL at 13:58

## 2019-03-03 RX ADMIN — SENNOSIDES 1 TABLET: 8.6 TABLET, FILM COATED ORAL at 19:58

## 2019-03-03 RX ADMIN — OXYCODONE HYDROCHLORIDE 10 MG: 5 TABLET ORAL at 17:31

## 2019-03-03 RX ADMIN — ACETAMINOPHEN 975 MG: 325 TABLET, FILM COATED ORAL at 04:58

## 2019-03-03 RX ADMIN — POLYETHYLENE GLYCOL 3350 17 G: 17 POWDER, FOR SOLUTION ORAL at 08:25

## 2019-03-03 RX ADMIN — DOCUSATE SODIUM 100 MG: 100 CAPSULE, LIQUID FILLED ORAL at 08:28

## 2019-03-03 RX ADMIN — GABAPENTIN 300 MG: 300 CAPSULE ORAL at 08:28

## 2019-03-03 RX ADMIN — CEFAZOLIN SODIUM 2 G: 2 INJECTION, SOLUTION INTRAVENOUS at 13:59

## 2019-03-03 RX ADMIN — OMEPRAZOLE 40 MG: 20 CAPSULE, DELAYED RELEASE ORAL at 08:25

## 2019-03-03 RX ADMIN — OXYCODONE HYDROCHLORIDE 10 MG: 5 TABLET ORAL at 22:11

## 2019-03-03 RX ADMIN — LISINOPRIL 20 MG: 20 TABLET ORAL at 08:26

## 2019-03-03 RX ADMIN — CEFAZOLIN SODIUM 2 G: 2 INJECTION, SOLUTION INTRAVENOUS at 19:59

## 2019-03-03 RX ADMIN — GABAPENTIN 300 MG: 300 CAPSULE ORAL at 13:58

## 2019-03-03 RX ADMIN — GABAPENTIN 300 MG: 300 CAPSULE ORAL at 19:59

## 2019-03-03 RX ADMIN — OXYCODONE HYDROCHLORIDE 10 MG: 5 TABLET ORAL at 06:41

## 2019-03-03 ASSESSMENT — ACTIVITIES OF DAILY LIVING (ADL)
ADLS_ACUITY_SCORE: 18
ADLS_ACUITY_SCORE: 17
ADLS_ACUITY_SCORE: 16
ADLS_ACUITY_SCORE: 18
ADLS_ACUITY_SCORE: 17
ADLS_ACUITY_SCORE: 18

## 2019-03-03 NOTE — PROGRESS NOTES
"[facility]  Neurosurgery Progress Note  Tereso Woodruff MD       2 Days Post-Op  Procedure(s):  redo right L5-S1 transforaminal lumbar interbody fusion    Interval history: Pt doing well this am. Says RLE is much better and pain is controlled. Has been out of bed.      Vital signs:   Blood pressure 144/69, pulse 85, temperature 98.2  F (36.8  C), temperature source Oral, resp. rate 16, height 1.702 m (5' 7.01\"), weight 111.6 kg (246 lb), SpO2 96 %.    Date 03/03/19 0700 - 03/04/19 0659   Shift 0285-3898 4127-8391 4098-0738 24 Hour Total   INTAKE   P.O. 520   520   Shift Total(mL/kg) 520(4.66)   520(4.66)   OUTPUT   Urine 375   375   Drains 40   40   Shift Total(mL/kg) 415(3.72)   415(3.72)   Weight (kg) 111.58 111.58 111.58 111.58       Exam:   Stable         Lab Results   Component Value Date    WBC 5.3 09/13/2018     Lab Results   Component Value Date    RBC 4.77 09/13/2018     Lab Results   Component Value Date    HGB 13.5 09/13/2018     Lab Results   Component Value Date    HCT 42.4 09/13/2018     No components found for: MCT  Lab Results   Component Value Date    MCV 89 09/13/2018     Lab Results   Component Value Date    MCH 28.3 09/13/2018     Lab Results   Component Value Date    MCHC 31.8 09/13/2018     Lab Results   Component Value Date    RDW 14.1 09/13/2018     Lab Results   Component Value Date     09/13/2018       Last Basic Metabolic Panel:  Lab Results   Component Value Date     09/13/2018      Lab Results   Component Value Date    POTASSIUM 3.9 09/13/2018     Lab Results   Component Value Date    CHLORIDE 109 09/13/2018     Lab Results   Component Value Date    MARINE 10.5 09/13/2018     Lab Results   Component Value Date    CO2 27 09/13/2018     Lab Results   Component Value Date    BUN 18 09/13/2018     Lab Results   Component Value Date    CR 1.38 09/13/2018     Lab Results   Component Value Date     09/13/2018       Lab Results   Component Value Date    INR " 1.04 09/13/2018       Active Problems:    S/P lumbar fusion      Plan:    Doing well  Needs to work with PT  Drains out when output less than 30 ml per shift  Likely home in am      Tereso Woodruff MD, MS, FAANS

## 2019-03-03 NOTE — PLAN OF CARE
Discharge Planner PT   Patient plan for discharge: TCU  Current status: Pt needing assist to shanell lumbar corset, transfers supine<sit<stand with FWW SBA. Amb 200ft with FWW, slowly with verbal cues for safety.  Unable to recall spinal precautions, continue to review.   Barriers to return to prior living situation: 14 steps in home, needing assist with donning corset, lives alone,   Recommendations for discharge: TCU  Rationale for recommendations: Pt will benefit from skilled PT during hospital stay and next level of care to increase his functional mobility skills to return to baseline and indep living.         Entered by: Ghada Lester 03/03/2019 10:55 AM

## 2019-03-03 NOTE — PLAN OF CARE
A&O x4 with some forgetfulness at baseline. LS CTA all fields. BS active x4. Dressing to spine is CDI, removed L AARTI drain. R AARTI to bulb suction. Pt still reports numbness to bilat hands and thighs, baseline numbness to R toe. MD aware. Trace swelling to BUE L>R. zeinab PO well. BG checks. up with A1 with brace and walker. PO oxycodone 10mg with scheduled tylenol managing pain. voiding in good amts. Will continue to monitor.

## 2019-03-03 NOTE — PLAN OF CARE
"Pt doing well POD 1, TLIF L5-S1.  Pain is well manged per pt report with po oxycodone, tylenol.  Pt is voiding in good amounts, tolerated reg diet.  Moving well with assist of one, brace, gb and walker.  PCA dc'd, IVF are saline licked.  Edema noted to upper extremities.  Dressing is CDI.  /78 (BP Location: Right arm)   Pulse 77   Temp 96.1  F (35.6  C) (Oral)   Resp 12   Ht 1.702 m (5' 7.01\")   Wt 111.6 kg (246 lb)   SpO2 94%   BMI 38.52 kg/m     Will continue to monitor  "

## 2019-03-03 NOTE — PLAN OF CARE
Up with assist x1, walker and gait belt. Gave oxycodone 2x this shift. Sat up in chair most of shift. Walked in heart with PT. Appetite good. Voiding well.    nicolette output 50cc this shift.

## 2019-03-03 NOTE — CONSULTS
Care Transition Initial Assessment - SW     Met with: Patient  Active Problems:    S/P lumbar fusion       DATA  Lives With: alone   Living Arrangements: 2-level duplex  Quality of Family Relationships: helpful  Description of Support System: Supportive, Involved.  Who is your support system?: PCA, Parent(s).  Support Assessment: Adequate family and caregiver support, Adequate social supports.   Identified issues/concerns regarding health management: Pt resides in a home in [place]. He is currently on a CADI Waiver,  Erica Espinosa, [phone]. VM left for CM with update on hospitalization and discharge plans. Pt currently receives PCA services, homemaking, and a life alert pendant. PT evaluated patient and has recommended TCU at discharge. Pt agreeable to rehabs recommendations. Requesting a shared room. Referrals sent to [facility], [facility], and [facility]. If insurance does not cover TCU stay, pt is unable to private pay $3000-$5000. Awaiting calls from TCU referrals sent. Anticipate a discharge on Monday, 3/4.     Quality of Family Relationships: helpful     ASSESSMENT  Cognitive Status:  awake, alert and oriented  Concerns to be addressed: Discharge planning, TCU placement.     PLAN  Financial costs for the patient includes: None anticipated.  Patient given options and choices for discharge Yes.  Patient/family is agreeable to the plan?  Yes. Pt agreeable to TCU at discharge, pending insurance coverage check  Patient Goals and Preferences: TCU, then return home with resumption of CADI waiver services.  Patient anticipates discharging to: TCU-TBD. Referrals sent. Discharge likely tomorrow, 3/4. Pt reports that he has friends available to transport at discharge. SW to continue following for discharge planning.

## 2019-03-03 NOTE — PROGRESS NOTES
[facility]    Hospitalist Progress Note      Assessment & Plan   Luis Godinez is a 58 year old male who was admitted on 3/1/2019.    Summary of Stay:   Patient was admitted by neurosurgery team for an elective spine surgery.  He has severe bilateral foraminal stenosis of L5-S1.  He underwent Redo L5-S1 decompression with L5-S1 TLIF.     He underwent procedure under general anesthesia.  Estimated blood loss documented to be 100 cc.    He is overall doing well.  His main issues are his back pain and constipation.    Plan:    Status post L5-S1 decompression with T LIF.  -Management per spine surgery.  -DVT prophylaxis per spine surgery.     Hypertension  - Continue HCTZ.  Lisinopril.     Diabetes  - Hold oral hypoglycemic medications.  -Keep on insulin sliding scale.     Gastroesophageal reflux disease  -Continue home omeprazole which works for him.     Dyslipidemia  -Continue atorvastatin.     Diabetic neuropathy  - Continue home gabapentin.    Constipation  Schedule senna and MiraLAX.  Dulcolax suppository as needed.      DVT Prophylaxis: Defer to primary service  Code Status: Full Code  Expected discharge: Tomorrow, recommended to transitional care unit once if ok with NSG.    Omi Rivas MD  Text Page (7am - 6pm, M-F)    Interval History   Patient was evaluated with nursing staff. Overnight issues discussed.  Patient is feeling better.  He still has the back pain which is responding to pain regime.  Complaining of constipation and bloating.  Denies any chest pain, shortness of breath, nausea or vomiting.    -Data reviewed today: Labs and medications.    Physical Exam   Temp: 98.2  F (36.8  C) Temp src: Oral BP: 144/69 Pulse: 85 Heart Rate: 86 Resp: 16 SpO2: 96 % O2 Device: None (Room air)    Vitals:    02/12/19 1400 03/01/19 0751   Weight: 111.6 kg (246 lb) 111.6 kg (246 lb)     Vital Signs with Ranges  Temp:  [96.1  F (35.6  C)-98.3  F (36.8  C)] 98.2  F (36.8  C)  Pulse:  [77-85] 85  Heart Rate:   [80-86] 86  Resp:  [12-16] 16  BP: (133-144)/(69-83) 144/69  FiO2 (%):  [21 %] 21 %  SpO2:  [94 %-96 %] 96 %  I/O last 3 completed shifts:  In: 2300 [P.O.:2300]  Out: 2885 [Urine:2650; Drains:235]    Constitutional: Awake, alert, cooperative, no apparent distress  HEENT: Trachea midline, sclera is clear   Respiratory: Clear to auscultation bilaterally, no crackles or wheezing  Cardiovascular: Regular rate and rhythm, normal S1 and S2, and no murmur noted  GI: Normal bowel sounds, soft, non-distended, non-tender  Skin/Integumen: No rashes, no cyanosis, no edema. Back brace in place  Psych: appropriate affect, no agitation   Extremities: No pitting edema     Medications       acetaminophen  975 mg Oral Q8H     atorvastatin  80 mg Oral Daily     ceFAZolin  2 g Intravenous Q8H     gabapentin  300 mg Oral TID     hydrochlorothiazide  25 mg Oral Daily     insulin aspart  1-7 Units Subcutaneous TID AC     insulin aspart  1-5 Units Subcutaneous At Bedtime     lisinopril  20 mg Oral Daily     omeprazole  40 mg Oral QAM     polyethylene glycol  17 g Oral Daily     sennosides  1 tablet Oral BID     sodium chloride (PF)  3 mL Intracatheter Q8H       Data   No lab results found in last 7 days.    No results found for this or any previous visit (from the past 24 hour(s)).

## 2019-03-03 NOTE — PLAN OF CARE
Discharge Planner OT   Patient plan for discharge: TCU  Current status: Pt is a 58 year old male s/p lumbar fusion.  Pt had previous right L5-S1 laminectomy, microdiscectomy and resection of synovial cyst by Dr. Ragland in 2011. PMHx sig for DM.  Per PT, Pt  able to transfer supine<>sit<>stand and take 6 steps bed to chair using FWW with min A x 2.  Pt lives alone and planned TCU stay with MD prior to surgery.  Barriers to return to prior living situation: lives alone with stairs, limited support  Recommendations for discharge: TCU  Rationale for recommendations: OT to complete order and defer treatment to next level of care.       Entered by: Samantha Howard 03/03/2019 8:21 AM

## 2019-03-04 ENCOUNTER — APPOINTMENT (OUTPATIENT)
Dept: PHYSICAL THERAPY | Facility: CLINIC | Age: 59
End: 2019-03-04
Attending: NEUROLOGICAL SURGERY
Payer: COMMERCIAL

## 2019-03-04 LAB
CREAT SERPL-MCNC: 1.24 MG/DL (ref 0.66–1.25)
GFR SERPL CREATININE-BSD FRML MDRD: 63 ML/MIN/{1.73_M2}
GLUCOSE BLDC GLUCOMTR-MCNC: 104 MG/DL (ref 70–99)
GLUCOSE BLDC GLUCOMTR-MCNC: 111 MG/DL (ref 70–99)
GLUCOSE BLDC GLUCOMTR-MCNC: 128 MG/DL (ref 70–99)
GLUCOSE BLDC GLUCOMTR-MCNC: 131 MG/DL (ref 70–99)
GLUCOSE BLDC GLUCOMTR-MCNC: 157 MG/DL (ref 70–99)

## 2019-03-04 PROCEDURE — 36415 COLL VENOUS BLD VENIPUNCTURE: CPT | Performed by: NEUROLOGICAL SURGERY

## 2019-03-04 PROCEDURE — 25000132 ZZH RX MED GY IP 250 OP 250 PS 637: Performed by: NEUROLOGICAL SURGERY

## 2019-03-04 PROCEDURE — 25000132 ZZH RX MED GY IP 250 OP 250 PS 637: Performed by: INTERNAL MEDICINE

## 2019-03-04 PROCEDURE — 94660 CPAP INITIATION&MGMT: CPT

## 2019-03-04 PROCEDURE — 97530 THERAPEUTIC ACTIVITIES: CPT | Mod: GP | Performed by: PHYSICAL THERAPY ASSISTANT

## 2019-03-04 PROCEDURE — 12000000 ZZH R&B MED SURG/OB

## 2019-03-04 PROCEDURE — 40000275 ZZH STATISTIC RCP TIME EA 10 MIN

## 2019-03-04 PROCEDURE — 97116 GAIT TRAINING THERAPY: CPT | Mod: GP | Performed by: PHYSICAL THERAPY ASSISTANT

## 2019-03-04 PROCEDURE — 99232 SBSQ HOSP IP/OBS MODERATE 35: CPT | Performed by: INTERNAL MEDICINE

## 2019-03-04 PROCEDURE — 00000146 ZZHCL STATISTIC GLUCOSE BY METER IP

## 2019-03-04 PROCEDURE — 25000128 H RX IP 250 OP 636: Performed by: NEUROLOGICAL SURGERY

## 2019-03-04 PROCEDURE — 25000132 ZZH RX MED GY IP 250 OP 250 PS 637: Performed by: PHYSICIAN ASSISTANT

## 2019-03-04 PROCEDURE — 82565 ASSAY OF CREATININE: CPT | Performed by: NEUROLOGICAL SURGERY

## 2019-03-04 PROCEDURE — 25000132 ZZH RX MED GY IP 250 OP 250 PS 637: Performed by: NURSE PRACTITIONER

## 2019-03-04 RX ORDER — SENNOSIDES 8.6 MG
2 TABLET ORAL 2 TIMES DAILY
Status: DISCONTINUED | OUTPATIENT
Start: 2019-03-04 | End: 2019-03-06 | Stop reason: HOSPADM

## 2019-03-04 RX ADMIN — DIAZEPAM 5 MG: 5 TABLET ORAL at 20:23

## 2019-03-04 RX ADMIN — ACETAMINOPHEN 975 MG: 325 TABLET, FILM COATED ORAL at 11:37

## 2019-03-04 RX ADMIN — POLYETHYLENE GLYCOL 3350 17 G: 17 POWDER, FOR SOLUTION ORAL at 08:21

## 2019-03-04 RX ADMIN — ACETAMINOPHEN 975 MG: 325 TABLET, FILM COATED ORAL at 05:13

## 2019-03-04 RX ADMIN — GABAPENTIN 300 MG: 300 CAPSULE ORAL at 14:44

## 2019-03-04 RX ADMIN — CEFAZOLIN SODIUM 2 G: 2 INJECTION, SOLUTION INTRAVENOUS at 20:23

## 2019-03-04 RX ADMIN — OXYCODONE HYDROCHLORIDE 10 MG: 5 TABLET ORAL at 20:23

## 2019-03-04 RX ADMIN — OXYCODONE HYDROCHLORIDE 10 MG: 5 TABLET ORAL at 17:31

## 2019-03-04 RX ADMIN — SENNOSIDES 1 TABLET: 8.6 TABLET, FILM COATED ORAL at 08:22

## 2019-03-04 RX ADMIN — OXYCODONE HYDROCHLORIDE 10 MG: 5 TABLET ORAL at 11:37

## 2019-03-04 RX ADMIN — OXYCODONE HYDROCHLORIDE 10 MG: 5 TABLET ORAL at 01:50

## 2019-03-04 RX ADMIN — SENNOSIDES 2 TABLET: 8.6 TABLET, FILM COATED ORAL at 20:23

## 2019-03-04 RX ADMIN — GABAPENTIN 300 MG: 300 CAPSULE ORAL at 08:23

## 2019-03-04 RX ADMIN — GABAPENTIN 300 MG: 300 CAPSULE ORAL at 20:23

## 2019-03-04 RX ADMIN — OXYCODONE HYDROCHLORIDE 10 MG: 5 TABLET ORAL at 08:25

## 2019-03-04 RX ADMIN — CEFAZOLIN SODIUM 2 G: 2 INJECTION, SOLUTION INTRAVENOUS at 11:37

## 2019-03-04 RX ADMIN — ATORVASTATIN CALCIUM 80 MG: 40 TABLET, FILM COATED ORAL at 08:22

## 2019-03-04 RX ADMIN — OXYCODONE HYDROCHLORIDE 5 MG: 5 TABLET ORAL at 14:44

## 2019-03-04 RX ADMIN — OXYCODONE HYDROCHLORIDE 10 MG: 5 TABLET ORAL at 05:13

## 2019-03-04 RX ADMIN — OMEPRAZOLE 40 MG: 20 CAPSULE, DELAYED RELEASE ORAL at 08:22

## 2019-03-04 RX ADMIN — HYDROCHLOROTHIAZIDE 25 MG: 25 TABLET ORAL at 08:22

## 2019-03-04 RX ADMIN — CEFAZOLIN SODIUM 2 G: 2 INJECTION, SOLUTION INTRAVENOUS at 05:10

## 2019-03-04 ASSESSMENT — ACTIVITIES OF DAILY LIVING (ADL)
ADLS_ACUITY_SCORE: 16
ADLS_ACUITY_SCORE: 16
ADLS_ACUITY_SCORE: 15
ADLS_ACUITY_SCORE: 16
ADLS_ACUITY_SCORE: 15
ADLS_ACUITY_SCORE: 15

## 2019-03-04 NOTE — PROGRESS NOTES
[facility]    Neurosurgery Progress Note    Date of Service (when I saw the patient): 03/04/2019     Assessment & Plan   Luis Godinez is a 58 year old male who was admitted on 3/1/2019. The pt presented with lumbar radicular pain.  He had previous right L5-S1 microdisc and resection of synovial cyst with Dr. Ragland in 2011. He was found to have severe bilateral foraminal stenosis at L5-S1 right greater than left and possible some residual synovial cyst versus herniated disc versus hypertrophied ligament with low back pain and RLE radicular pain. Pt is sitting up in bed eating breakfast today.  He only notes incisional pain to his lumbar spine. He has been up ambulating.  His drain continues to have over 30 CC output.  Possible discharge home tomorrow.        Active Problems:    S/P lumbar fusion    Assessment: stable with pain     Plan: PT/OT and ambulation  Brace when out of bed   Pain control   Encourage use of IS and deep breathing   Keep drains in until 30 cc or less output in one shift  Suture/Staple removal in 10-14 days          I have discussed the following assessment and plan Dr. Tereso Woodruff  who is in agreement with initial plan and will follow up with further consultation recommendations.    Krissy Gibson [facility]  [facility]  [address]  [address] [place] [address]    Tel [phone]  Pager [phone]      Interval History   Stable     Physical Exam   Temp: 95.7  F (35.4  C) Temp src: Oral BP: 119/71   Heart Rate: 70 Resp: 16 SpO2: 100 % O2 Device: None (Room air)    Vitals:    02/12/19 1400 03/01/19 0751   Weight: 246 lb (111.6 kg) 246 lb (111.6 kg)     Vital Signs with Ranges  Temp:  [95.7  F (35.4  C)-98.2  F (36.8  C)] 95.7  F (35.4  C)  Heart Rate:  [63-86] 70  Resp:  [14-20] 16  BP: (119-144)/(64-79) 119/71  FiO2 (%):  [21 %] 21 %  SpO2:  [95 %-100 %] 100 %  I/O last 3 completed shifts:  In: 2020 [P.O.:2020]  Out: 9216  "[Urine:2450; Drains:205]    Heart Rate: 70, Blood pressure 119/71, pulse 85, temperature 95.7  F (35.4  C), resp. rate 16, height 5' 7.01\" (1.702 m), weight 246 lb (111.6 kg), SpO2 100 %.  246 lbs 0 oz  HEENT:  Normocephalic, atraumatic.  PERRLA.  EOM s intact.    Neck:  Supple, non-tender, without lymphadenopathy.  Heart:  No peripheral edema  Lungs:  No SOB  Abdomen:  Soft, non-tender, non-distended.   Skin:  Warm and dry, good capillary refill.  Extremities:  Good radial and dorsalis pedis pulses bilaterally, no edema, cyanosis or clubbing.    NEUROLOGICAL EXAMINATION:     Mental status:  Alert and Oriented x 3, speech is fluent.  Cranial nerves:  II-XII intact.   Motor:  Strength is 5/5 throughout the upper and lower extremities  Shoulder Abduction:  Right:  5/5   Left:  5/5  Biceps:                      Right:  5/5   Left:  5/5  Triceps:                     Right:  5/5   Left:  5/5  Wrist Extensors:       Right:  5/5   Left:  5/5  Wrist Flexors:           Right:  5/5   Left:  5/5  interosseus :            Right:  5/5   Left:  5/5   Hip Flexor:                Right: 5/5  Left:  5/5  Hip Adductor:             Right:  5/5  Left:  5/5  Hip Abductor:             Right:  5/5  Left:  5/5  Gastroc Soleus:        Right:  5/5  Left:  5/5  Tib/Ant:                      Right:  5/5  Left:  5/5  EHL:                     Right:  5/5  Left:  5/5  Sensation:  intact  Gait: up with assist       Medications       acetaminophen  975 mg Oral Q8H     atorvastatin  80 mg Oral Daily     ceFAZolin  2 g Intravenous Q8H     gabapentin  300 mg Oral TID     hydrochlorothiazide  25 mg Oral Daily     insulin aspart  1-7 Units Subcutaneous TID AC     insulin aspart  1-5 Units Subcutaneous At Bedtime     lisinopril  20 mg Oral Daily     omeprazole  40 mg Oral QAM     polyethylene glycol  17 g Oral Daily     sennosides  1 tablet Oral BID     sodium chloride (PF)  3 mL Intracatheter Q8H       Data     All new lab and imaging data was personally " reviewed by me.  CBC RESULTS:   Recent Labs   Lab Test 09/13/18  0557   WBC 5.3   RBC 4.77   HGB 13.5   HCT 42.4   MCV 89   MCH 28.3   MCHC 31.8   RDW 14.1        Basic Metabolic Panel:  Lab Results   Component Value Date     09/13/2018      Lab Results   Component Value Date    POTASSIUM 3.9 09/13/2018     Lab Results   Component Value Date    CHLORIDE 109 09/13/2018     Lab Results   Component Value Date    MARINE 10.5 09/13/2018     Lab Results   Component Value Date    CO2 27 09/13/2018     Lab Results   Component Value Date    BUN 18 09/13/2018     Lab Results   Component Value Date    CR 1.24 03/04/2019     Lab Results   Component Value Date     09/13/2018     INR:  Lab Results   Component Value Date    INR 1.04 09/13/2018

## 2019-03-04 NOTE — PLAN OF CARE
A&O x4. Up w/Ax1 w/brace and walker. BS hypoactive, passing flatus, last BM 2/27. Voiding well. Tolerating regular diet well. CMS: numbness both thighs. SL, given ancef. AARTI drained 65ml.  Dressing CDI. Pain managed w/PRN oxycodone and scheduled Tylenol. Plans to discharge to TCU possibly today.

## 2019-03-04 NOTE — PLAN OF CARE
Vital signs stable.'  Lungs clear, bowel sounds hypoactive, passing flatus, voiding in good amounts.  Dressing intact, Wears corset brace when up.Ambulated with walker ,gait belt and sba of 1.Pain controlled with scheduled tylenol and oxycodone.  Plan of care reviewed with pt.

## 2019-03-04 NOTE — PROGRESS NOTES
SWS     D: Discharge planning continuing.. noted progress in PT, anticipation of pt's discharge home tomorrow per spine PA documentation. SW following, available until discharge should discharge needs be identified, it is noted that PT is recommending home PT at this point, will arrange with him if/as MD orders.

## 2019-03-04 NOTE — PROGRESS NOTES
S: Pt. seen in [facility]. Male. Dr. Woodruff.RX: Lumbar Sacral Corset. DX: L5-S1 fusion.   O:A: Pt. has spinal fusion on 3-1-19. Today I F/D a Elastic LSO. LSO to provide intercavitary pressure to reduce load on intervertebral discs and reduce motion. Donning doffing wear and care instructions given.  P: Pt. to be seen as needed.    Jamar FARIAS,LO

## 2019-03-04 NOTE — PLAN OF CARE
Pain well controlled with Oxycodone 10 mg.  SBA with brace on.  States he's still numb in front of his thighs and hands but is improving.  Blood sugars under 115 today.  AARTI draining more than 30 ml (see flowsheet).  Plan is discharge home tomorrow.

## 2019-03-04 NOTE — PROGRESS NOTES
[facility]    Hospitalist Progress Note      Assessment & Plan   Luis Godinez is a 58 year old male who was admitted on 3/1/2019 for an elective spine surgery.    Summary of Stay:   He had severe bilateral foraminal stenosis of L5-S1 with left LE radiculopathy. S/P Redo L5-S1 decompression with L5-S1 TLIF.     He underwent procedure under general anesthesia.  Estimated blood loss documented to be 100 cc.  Drain still in place, with ongoing output.    Continues to do very well. No stool yet. Pain control is satisfactory.    Plan:  POD #3 Status post L5-S1 decompressive laminectomy with right facetectomy and decompression of nerve roots.  Placement of pedicle screws and interbody autologous bone.  -Management per spine surgery.  -DVT prophylaxis per spine surgery.     Hypertension, now relatively hypotensive, but without symptoms  - Continue Lisinopril.  -- will stop further HCTZ     Diabetes, control is excellent  - Hold oral hypoglycemic medications.  - Keep on insulin sliding scale.     Gastroesophageal reflux disease  -Continue home omeprazole which works for him.     Dyslipidemia  -Continue atorvastatin.     Diabetic neuropathy  - Continue home gabapentin.    Constipation  Schedule senna 2 tabs and MiraLAX.  Dulcolax suppository as needed.      DVT Prophylaxis: Defer to primary service  Code Status: Full Code  Expected discharge: Tomorrow, recommended to transitional care unit once if ok with NSG.    Luis Cedeno MD    Interval History   Reports on-going pain, as expected. Improved from before the procedure.   No SOB or cough, no CP, N/V/D. No stool yet, but denies abd distention and loss of appetite.    -Data reviewed today: Labs and medications.    Physical Exam   Temp: 95.7  F (35.4  C) Temp src: Oral BP: 119/71   Heart Rate: 70 Resp: 16 SpO2: 100 % O2 Device: None (Room air)    Vitals:    02/12/19 1400 03/01/19 0751   Weight: 111.6 kg (246 lb) 111.6 kg (246 lb)     Vital Signs with  Ranges  Temp:  [95.7  F (35.4  C)-96.9  F (36.1  C)] 95.7  F (35.4  C)  Heart Rate:  [63-78] 70  Resp:  [14-20] 16  BP: (119-131)/(64-79) 119/71  FiO2 (%):  [21 %] 21 %  SpO2:  [95 %-100 %] 100 %  I/O last 3 completed shifts:  In: 2020 [P.O.:2020]  Out: 2655 [Urine:2450; Drains:205]    Constitutional: Awake, alert, cooperative, no apparent distress  HEENT: Trachea midline, sclera is clear   Respiratory: Clear to auscultation bilaterally, no crackles or wheezing  Cardiovascular: Regular rate and rhythm, normal S1 and S2, and no murmur noted  GI: Normal bowel sounds, soft, non-distended, non-tender  Skin/Integumen: No rashes, no cyanosis, no edema. Back brace in place  Psych: appropriate affect, no agitation   Extremities: No pitting edema     Medications       acetaminophen  975 mg Oral Q8H     atorvastatin  80 mg Oral Daily     ceFAZolin  2 g Intravenous Q8H     gabapentin  300 mg Oral TID     hydrochlorothiazide  25 mg Oral Daily     insulin aspart  1-7 Units Subcutaneous TID AC     insulin aspart  1-5 Units Subcutaneous At Bedtime     lisinopril  20 mg Oral Daily     omeprazole  40 mg Oral QAM     polyethylene glycol  17 g Oral Daily     sennosides  1 tablet Oral BID     sodium chloride (PF)  3 mL Intracatheter Q8H       Data   Recent Labs   Lab 03/04/19  0537   CR 1.24       No results found for this or any previous visit (from the past 24 hour(s)).

## 2019-03-04 NOTE — PLAN OF CARE
Discharge Planner PT   Patient plan for discharge: Pt plans on going to TCU prior to home alone  Current status: PT - Pt amb 350' with ww with supervision to indep with = step length.  PT - Pt transfers supine to sidelying to sit with supervision to indep with use of bed rail. Pt transfers sit to/from stand indep and supervision to indep with bed to chair with ww.  Educated pt on donning LSO correctly.     P.M. Session - PT - Pt amb 325' with ww indep with = step length. Goal met  PT - Pt performed 12 steps with both rails with supervision to indep with reciprocal pattern. Goal met PT - Pt transfers in all indep. Goals met  Barriers to return to prior living situation: lives alone, 13 steps in home to bathroom, level of assist needed for mobility skills, limited activity tolerance  Recommendations for discharge: Changed to home with assist and HH PT, after collaboration with the PT.    Rationale for recommendations:  Note improvement with all transfers and ambulation with goals nearly met.     PT - Collaborated with PT - all goals met - please refer to discharge summary.   Pt to discharge home with assist and HHPT - issued wheeled walker for home use.          Entered by: Ashley Khan 03/04/2019 9:00 AM

## 2019-03-05 LAB
ANION GAP SERPL CALCULATED.3IONS-SCNC: 5 MMOL/L (ref 3–14)
BUN SERPL-MCNC: 15 MG/DL (ref 7–30)
CALCIUM SERPL-MCNC: 8.6 MG/DL (ref 8.5–10.1)
CHLORIDE SERPL-SCNC: 102 MMOL/L (ref 94–109)
CO2 SERPL-SCNC: 28 MMOL/L (ref 20–32)
CREAT SERPL-MCNC: 1.24 MG/DL (ref 0.66–1.25)
GFR SERPL CREATININE-BSD FRML MDRD: 63 ML/MIN/{1.73_M2}
GLUCOSE BLDC GLUCOMTR-MCNC: 121 MG/DL (ref 70–99)
GLUCOSE BLDC GLUCOMTR-MCNC: 134 MG/DL (ref 70–99)
GLUCOSE BLDC GLUCOMTR-MCNC: 149 MG/DL (ref 70–99)
GLUCOSE BLDC GLUCOMTR-MCNC: 164 MG/DL (ref 70–99)
GLUCOSE BLDC GLUCOMTR-MCNC: 191 MG/DL (ref 70–99)
GLUCOSE SERPL-MCNC: 113 MG/DL (ref 70–99)
HGB BLD-MCNC: 12.4 G/DL (ref 13.3–17.7)
POTASSIUM SERPL-SCNC: 3.9 MMOL/L (ref 3.4–5.3)
SODIUM SERPL-SCNC: 135 MMOL/L (ref 133–144)

## 2019-03-05 PROCEDURE — 25000132 ZZH RX MED GY IP 250 OP 250 PS 637: Performed by: NURSE PRACTITIONER

## 2019-03-05 PROCEDURE — 36415 COLL VENOUS BLD VENIPUNCTURE: CPT | Performed by: INTERNAL MEDICINE

## 2019-03-05 PROCEDURE — 94660 CPAP INITIATION&MGMT: CPT

## 2019-03-05 PROCEDURE — 40000275 ZZH STATISTIC RCP TIME EA 10 MIN

## 2019-03-05 PROCEDURE — 85018 HEMOGLOBIN: CPT | Performed by: INTERNAL MEDICINE

## 2019-03-05 PROCEDURE — 25000132 ZZH RX MED GY IP 250 OP 250 PS 637: Performed by: INTERNAL MEDICINE

## 2019-03-05 PROCEDURE — 25000132 ZZH RX MED GY IP 250 OP 250 PS 637: Performed by: PHYSICIAN ASSISTANT

## 2019-03-05 PROCEDURE — 99232 SBSQ HOSP IP/OBS MODERATE 35: CPT | Performed by: INTERNAL MEDICINE

## 2019-03-05 PROCEDURE — 25000132 ZZH RX MED GY IP 250 OP 250 PS 637: Performed by: NEUROLOGICAL SURGERY

## 2019-03-05 PROCEDURE — 80048 BASIC METABOLIC PNL TOTAL CA: CPT | Performed by: INTERNAL MEDICINE

## 2019-03-05 PROCEDURE — 25000128 H RX IP 250 OP 636: Performed by: NEUROLOGICAL SURGERY

## 2019-03-05 PROCEDURE — 12000000 ZZH R&B MED SURG/OB

## 2019-03-05 PROCEDURE — 00000146 ZZHCL STATISTIC GLUCOSE BY METER IP

## 2019-03-05 RX ADMIN — OXYCODONE HYDROCHLORIDE 10 MG: 5 TABLET ORAL at 20:38

## 2019-03-05 RX ADMIN — DIAZEPAM 5 MG: 5 TABLET ORAL at 02:46

## 2019-03-05 RX ADMIN — CEFAZOLIN SODIUM 2 G: 2 INJECTION, SOLUTION INTRAVENOUS at 11:35

## 2019-03-05 RX ADMIN — POLYETHYLENE GLYCOL 3350 17 G: 17 POWDER, FOR SOLUTION ORAL at 08:15

## 2019-03-05 RX ADMIN — OXYCODONE HYDROCHLORIDE 10 MG: 5 TABLET ORAL at 01:57

## 2019-03-05 RX ADMIN — ATORVASTATIN CALCIUM 80 MG: 40 TABLET, FILM COATED ORAL at 08:15

## 2019-03-05 RX ADMIN — CEFAZOLIN SODIUM 2 G: 2 INJECTION, SOLUTION INTRAVENOUS at 05:03

## 2019-03-05 RX ADMIN — GABAPENTIN 300 MG: 300 CAPSULE ORAL at 08:15

## 2019-03-05 RX ADMIN — OXYCODONE HYDROCHLORIDE 10 MG: 5 TABLET ORAL at 08:15

## 2019-03-05 RX ADMIN — DIAZEPAM 5 MG: 5 TABLET ORAL at 20:38

## 2019-03-05 RX ADMIN — OMEPRAZOLE 40 MG: 20 CAPSULE, DELAYED RELEASE ORAL at 08:15

## 2019-03-05 RX ADMIN — OXYCODONE HYDROCHLORIDE 10 MG: 5 TABLET ORAL at 16:51

## 2019-03-05 RX ADMIN — LISINOPRIL 20 MG: 20 TABLET ORAL at 08:15

## 2019-03-05 RX ADMIN — GABAPENTIN 300 MG: 300 CAPSULE ORAL at 14:14

## 2019-03-05 RX ADMIN — OXYCODONE HYDROCHLORIDE 10 MG: 5 TABLET ORAL at 11:25

## 2019-03-05 RX ADMIN — SENNOSIDES 2 TABLET: 8.6 TABLET, FILM COATED ORAL at 08:15

## 2019-03-05 RX ADMIN — OXYCODONE HYDROCHLORIDE 10 MG: 5 TABLET ORAL at 05:03

## 2019-03-05 RX ADMIN — GABAPENTIN 300 MG: 300 CAPSULE ORAL at 20:38

## 2019-03-05 ASSESSMENT — ACTIVITIES OF DAILY LIVING (ADL)
ADLS_ACUITY_SCORE: 15
ADLS_ACUITY_SCORE: 14

## 2019-03-05 NOTE — PLAN OF CARE
A&O x4. Up w/Ax1 w/brace and walker. BS hypoactive, passing flatus, last BM 2/28. Voiding well. Tolerating regular diet well. CMS: numbness both thighs and hands. SL, given ancef. AARTI in place.  Dressing CDI. Pain managed w/PRN oxycodone and valium. Plans to discharge home possibly today.

## 2019-03-05 NOTE — PROGRESS NOTES
[facility]    Hospitalist Progress Note      Assessment & Plan   Luis Godinez is a 58 year old male who was admitted on 3/1/2019 for an elective spine surgery.  He continues to do very well.    Summary of Stay:   He had severe bilateral foraminal stenosis of L5-S1 with left LE radiculopathy. S/P Redo L5-S1 decompression with L5-S1 TLIF.     He underwent procedure under general anesthesia.  Estimated blood loss documented to be 100 cc.  Drain still in place, with ongoing output too brisk to permit patient to go home today..    Continues to do very well. No stool yet. Pain control is satisfactory.    Plan:  POD #4 Status post L5-S1 decompressive laminectomy with right facetectomy and decompression of nerve roots.  Placement of pedicle screws and interbody autologous bone.  -Management per spine surgery.  -DVT prophylaxis per spine surgery.     Hypertension, controlled is very satisfactory on lisinopril alone.  -At this time, I have instructed the patient to not resume hydrochlorothiazide until he has an opportunity to meet with his primary physician in a week or so following discharge.     Diabetes, control remains excellent; the patient is requiring minimal amounts of insulin.  - Hold oral hypoglycemic medications.  - Keep on insulin sliding scale.     Gastroesophageal reflux disease  -Continue home omeprazole which works for him.     Dyslipidemia  -Continue atorvastatin.     Diabetic neuropathy  - Continue home gabapentin.    Constipation  Schedule senna 2 tabs and MiraLAX.  Dulcolax suppository as needed.      DVT Prophylaxis: Defer to primary service  Code Status: Full Code  Expected discharge: Tomorrow, recommended to transitional care unit once if ok with NSG.    Luis Cedeno MD    Interval History   Reports on-going pain, as expected. Improved from before the procedure.   No SOB or cough, no CP, N/V/D. No stool yet, but denies abd distention and loss of appetite.  The paresthesias the patient  felt in the anterior thighs and feet yesterday are now improved.    -Data reviewed today: Labs and medications.    Physical Exam   Temp: 98.8  F (37.1  C) Temp src: Axillary BP: 125/64   Heart Rate: 85 Resp: 16 SpO2: 96 % O2 Device: BiPAP/CPAP    Vitals:    02/12/19 1400 03/01/19 0751   Weight: 111.6 kg (246 lb) 111.6 kg (246 lb)     Vital Signs with Ranges  Temp:  [97.8  F (36.6  C)-98.8  F (37.1  C)] 98.8  F (37.1  C)  Heart Rate:  [72-89] 85  Resp:  [16-18] 16  BP: ()/(56-83) 125/64  FiO2 (%):  [21 %] 21 %  SpO2:  [96 %] 96 %  I/O last 3 completed shifts:  In: 1600 [P.O.:1600]  Out: 2320 [Urine:2200; Drains:120]    Constitutional: Awake, alert, cooperative, no apparent distress  HEENT: Trachea midline, sclera is clear   Respiratory: Clear to auscultation bilaterally, no crackles or wheezing  Cardiovascular: Regular rate and rhythm, normal S1 and S2, and no murmur noted  GI: Normal bowel sounds, soft, non-distended, non-tender  Skin/Integumen: No rashes, no cyanosis, no edema. Back brace in place  Psych: appropriate affect, no agitation   Extremities: No pitting edema     Medications       atorvastatin  80 mg Oral Daily     ceFAZolin  2 g Intravenous Q8H     gabapentin  300 mg Oral TID     insulin aspart  1-7 Units Subcutaneous TID AC     insulin aspart  1-5 Units Subcutaneous At Bedtime     lisinopril  20 mg Oral Daily     omeprazole  40 mg Oral QAM     polyethylene glycol  17 g Oral Daily     sennosides  2 tablet Oral BID     sodium chloride (PF)  3 mL Intracatheter Q8H       Data   Recent Labs   Lab 03/05/19  0624 03/04/19  0537   HGB 12.4*  --      --    POTASSIUM 3.9  --    CHLORIDE 102  --    CO2 28  --    BUN 15  --    CR 1.24 1.24   ANIONGAP 5  --    MARINE 8.6  --    *  --        No results found for this or any previous visit (from the past 24 hour(s)).

## 2019-03-05 NOTE — PROGRESS NOTES
[facility]    Neurosurgery Progress Note    Date of Service (when I saw the patient): 03/05/2019     Assessment & Plan   Luis Godinez is a 58 year old male who was admitted on 3/1/2019. The pt presented with lumbar radicular pain.  He had previous right L5-S1 microdisc and resection of synovial cyst with Dr. Ragland in 2011. He was found to have severe bilateral foraminal stenosis at L5-S1 right greater than left and possible some residual synovial cyst versus herniated disc versus hypertrophied ligament with low back pain and RLE radicular pain. Pt is sitting up in bed eating breakfast today.  he notese soreness to his back. He has been up with therapies.  The pts drain had over 60cc out last night. Will place drain to gravity. Plan discharge home tomorrow.         Active Problems:    S/P lumbar fusion    Assessment: stable with pain     Plan: PT/OT and ambulation  Brace when out of bed   Pain control   Encourage use of IS and deep breathing   Keep drains in until 30 cc or less output in one shift  Suture/Staple removal in 10-14 days              I have discussed the following assessment and plan Dr. Tereso Woodruff  who is in agreement with initial plan and will follow up with further consultation recommendations.    Krissy Gibson [facility]  [facility]  [address] [address]    Tel [phone]  Pager [phone]      Interval History   Stable     Physical Exam   Temp: 98.8  F (37.1  C) Temp src: Axillary BP: 125/64   Heart Rate: 85 Resp: 16 SpO2: 96 % O2 Device: BiPAP/CPAP    Vitals:    02/12/19 1400 03/01/19 0751   Weight: 246 lb (111.6 kg) 246 lb (111.6 kg)     Vital Signs with Ranges  Temp:  [97.8  F (36.6  C)-98.8  F (37.1  C)] 98.8  F (37.1  C)  Heart Rate:  [72-89] 85  Resp:  [16-18] 16  BP: ()/(56-83) 125/64  FiO2 (%):  [21 %] 21 %  SpO2:  [96 %] 96 %  I/O last 3 completed shifts:  In: 1600 [P.O.:1600]  Out: 1754  "[Urine:2200; Drains:120]    Heart Rate: 85, Blood pressure 125/64, pulse 85, temperature 98.8  F (37.1  C), temperature source Axillary, resp. rate 16, height 5' 7.01\" (1.702 m), weight 246 lb (111.6 kg), SpO2 96 %.  246 lbs 0 oz  HEENT:  Normocephalic, atraumatic.  PERRLA.  EOM s intact.    Neck:  Supple, non-tender, without lymphadenopathy.  Heart:  No peripheral edema  Lungs:  No SOB  Abdomen:  Soft, non-tender, non-distended.   Skin:  Warm and dry, good capillary refill.  Extremities:  Good radial and dorsalis pedis pulses bilaterally, no edema, cyanosis or clubbing.    NEUROLOGICAL EXAMINATION:     Mental status:  Alert and Oriented x 3, speech is fluent.  Cranial nerves:  II-XII intact.   Motor:  Strength is 5/5 throughout the upper and lower extremities  Shoulder Abduction:  Right:  5/5   Left:  5/5  Biceps:                      Right:  5/5   Left:  5/5  Triceps:                     Right:  5/5   Left:  5/5  Wrist Extensors:       Right:  5/5   Left:  5/5  Wrist Flexors:           Right:  5/5   Left:  5/5  interosseus :            Right:  5/5   Left:  5/5   Hip Flexor:                Right: 5/5  Left:  5/5  Hip Adductor:             Right:  5/5  Left:  5/5  Hip Abductor:             Right:  5/5  Left:  5/5  Gastroc Soleus:        Right:  5/5  Left:  5/5  Tib/Ant:                      Right:  5/5  Left:  5/5  EHL:                     Right:  5/5  Left:  5/5  Sensation:  intact  Gait:  Up with assist     Medications       atorvastatin  80 mg Oral Daily     ceFAZolin  2 g Intravenous Q8H     gabapentin  300 mg Oral TID     insulin aspart  1-7 Units Subcutaneous TID AC     insulin aspart  1-5 Units Subcutaneous At Bedtime     lisinopril  20 mg Oral Daily     omeprazole  40 mg Oral QAM     polyethylene glycol  17 g Oral Daily     sennosides  2 tablet Oral BID     sodium chloride (PF)  3 mL Intracatheter Q8H       Data     All new lab and imaging data was personally reviewed by me.  CBC RESULTS:   Recent Labs   Lab " Test 03/05/19  0624 09/13/18  0557   WBC  --  5.3   RBC  --  4.77   HGB 12.4* 13.5   HCT  --  42.4   MCV  --  89   MCH  --  28.3   MCHC  --  31.8   RDW  --  14.1   PLT  --  204     Basic Metabolic Panel:  Lab Results   Component Value Date     03/05/2019      Lab Results   Component Value Date    POTASSIUM 3.9 03/05/2019     Lab Results   Component Value Date    CHLORIDE 102 03/05/2019     Lab Results   Component Value Date    MARINE 8.6 03/05/2019     Lab Results   Component Value Date    CO2 28 03/05/2019     Lab Results   Component Value Date    BUN 15 03/05/2019     Lab Results   Component Value Date    CR 1.24 03/05/2019     Lab Results   Component Value Date     03/05/2019     INR:  Lab Results   Component Value Date    INR 1.04 09/13/2018

## 2019-03-05 NOTE — PLAN OF CARE
Pain well controlled with oxycodone 10 mg.  Up with one assist.  Incision open to air.  Drain discontinued.  Patient has not had a BM since last Thursday.  States he does not feel constipated.  Home tomorrow.

## 2019-03-05 NOTE — PROGRESS NOTES
RT Note      Patient on CPAP +8 21% FIO2. Patient tolerating well through the night.      Will continue to follow and monitor.        Belle Mcdaniel, RRT

## 2019-03-06 VITALS
OXYGEN SATURATION: 95 % | WEIGHT: 246 LBS | BODY MASS INDEX: 38.61 KG/M2 | TEMPERATURE: 96.7 F | HEIGHT: 67 IN | RESPIRATION RATE: 16 BRPM | DIASTOLIC BLOOD PRESSURE: 74 MMHG | HEART RATE: 85 BPM | SYSTOLIC BLOOD PRESSURE: 107 MMHG

## 2019-03-06 LAB
GLUCOSE BLDC GLUCOMTR-MCNC: 124 MG/DL (ref 70–99)
GLUCOSE BLDC GLUCOMTR-MCNC: 148 MG/DL (ref 70–99)

## 2019-03-06 PROCEDURE — 25000132 ZZH RX MED GY IP 250 OP 250 PS 637: Performed by: NURSE PRACTITIONER

## 2019-03-06 PROCEDURE — 25000132 ZZH RX MED GY IP 250 OP 250 PS 637: Performed by: INTERNAL MEDICINE

## 2019-03-06 PROCEDURE — 25000132 ZZH RX MED GY IP 250 OP 250 PS 637: Performed by: NEUROLOGICAL SURGERY

## 2019-03-06 PROCEDURE — 00000146 ZZHCL STATISTIC GLUCOSE BY METER IP

## 2019-03-06 RX ORDER — OXYCODONE HYDROCHLORIDE 5 MG/1
5-10 TABLET ORAL
Qty: 75 TABLET | Refills: 0 | Status: SHIPPED | OUTPATIENT
Start: 2019-03-06 | End: 2019-03-12

## 2019-03-06 RX ADMIN — OMEPRAZOLE 40 MG: 20 CAPSULE, DELAYED RELEASE ORAL at 09:50

## 2019-03-06 RX ADMIN — DIAZEPAM 5 MG: 5 TABLET ORAL at 04:26

## 2019-03-06 RX ADMIN — GABAPENTIN 300 MG: 300 CAPSULE ORAL at 09:50

## 2019-03-06 RX ADMIN — OXYCODONE HYDROCHLORIDE 10 MG: 5 TABLET ORAL at 04:26

## 2019-03-06 RX ADMIN — SENNOSIDES 2 TABLET: 8.6 TABLET, FILM COATED ORAL at 09:50

## 2019-03-06 RX ADMIN — ATORVASTATIN CALCIUM 80 MG: 40 TABLET, FILM COATED ORAL at 09:50

## 2019-03-06 RX ADMIN — OXYCODONE HYDROCHLORIDE 10 MG: 5 TABLET ORAL at 07:52

## 2019-03-06 RX ADMIN — POLYETHYLENE GLYCOL 3350 17 G: 17 POWDER, FOR SOLUTION ORAL at 09:50

## 2019-03-06 RX ADMIN — OXYCODONE HYDROCHLORIDE 10 MG: 5 TABLET ORAL at 00:47

## 2019-03-06 ASSESSMENT — ACTIVITIES OF DAILY LIVING (ADL)
ADLS_ACUITY_SCORE: 14

## 2019-03-06 NOTE — DISCHARGE SUMMARY
[facility]    Discharge Summary  Neurosurgery    Date of Admission:  3/1/2019  Date of Discharge:  3/6/2019  Discharging Provider: Krissy Gibson  Date of Service (when I saw the patient): 03/06/19    Discharge Diagnoses   Active Problems:    S/P lumbar fusion      History of Present Illness   Luis Godinez is a 58 year old male who was admitted on 3/1/2019. The pt presented with lumbar radicular pain.  He had previous right L5-S1 microdisc and resection of synovial cyst with Dr. Ragland in 2011. He was found to have severe bilateral foraminal stenosis at L5-S1 right greater than left and possible some residual synovial cyst versus herniated disc versus hypertrophied ligament with low back pain and RLE radicular pain.  The pt is lying in bed today eating. He states that he has lumbar pain and is ready to discharge home.           Hospital Course   Luis Godinez was admitted on 3/1/2019.  The following problems were addressed during his hospitalization:    Active Problems:    S/P lumbar fusion    Assessment: stable    Plan: discharge home         I have discussed the following assessment and plan with Dr. Tereso Woodruff  who is in agreement with initial plan and will follow up with further consultation recommendations.    Krissy Gisbon [facility]  [facility]  [place] [address]    Tel [phone]  Pager [phone]        Significant Results and Procedures   Post fusion     Pending Results     Unresulted Labs Ordered in the Past 30 Days of this Admission     No orders found from 12/31/2018 to 3/2/2019.          Code Status   Full Code    Primary Care Physician   [facility]    Physical Exam   Temp: 98.7  F (37.1  C) Temp src: Oral BP: 134/88   Heart Rate: 93 Resp: 16 SpO2: 96 % O2 Device: None (Room air)    Vitals:    02/12/19 1400 03/01/19 0751   Weight: 246 lb (111.6 kg) 246 lb (111.6 kg)     Vital  Signs with Ranges  Temp:  [98  F (36.7  C)-99.1  F (37.3  C)] 98.7  F (37.1  C)  Heart Rate:  [93-95] 93  Resp:  [16] 16  BP: (117-134)/(55-88) 134/88  FiO2 (%):  [21 %] 21 %  SpO2:  [93 %-96 %] 96 %  I/O last 3 completed shifts:  In: 2125 [P.O.:2125]  Out: 2730 [Urine:2620; Drains:110]    Constitutional: Awake, alert, cooperative, no apparent distress, and appears stated age.  Eyes: Lids and lashes normal, pupils equal, round and reactive to light, extra ocular muscles intact  ENT: Normocephalic, without obvious abnormality, atraumatic  Respiratory: No increased work of breathing  Skin: No bruising or bleeding, normal skin color, texture, turgor, no redness, warmth, or swelling.  Incision with staples intact, minimal drainage, open to air.  Musculoskeletal: There is no redness, warmth, or swelling of the joints.  Full range of motion noted.  Motor strength is 5 out of 5 all extremities bilaterally.  Tone is normal.  Neurologic: Awake, alert, oriented to name, place and time.  Cranial nerves II-XII are grossly intact.  Motor is 5 out of 5 bilaterally.     Neuropsychiatric: Calm, normal eye contact, alert, normal affect, oriented to self, place, time and situation, memory for past and recent events intact and thought process normal.       Time Spent on this Encounter   I, Krissy Gibson, personally saw the patient today and spent greater than 30 minutes discharging this patient.    Discharge Disposition   Discharged to home  Condition at discharge: Stable      Consultations This Hospital Stay   OCCUPATIONAL THERAPY ADULT IP CONSULT  PHYSICAL THERAPY ADULT IP CONSULT  ORTHOSIS SPINAL IP CONSULT  HOSPITALIST IP CONSULT  SOCIAL WORK IP CONSULT    Discharge Orders      XR Lumbar Spine 2/3 Views     Reason for your hospital stay    You were in the hospital for a Redo L5-S1 decompression with L5-S1 TLIF     Follow-up and recommended labs and tests     Please follow up at the [facility] in 10-14 days for  staple removal and in 6 weeks with a lumbar xray.  Please call the clinic at [phone] to schedule your appointment with Dr. Tereso Woodruff     Activity    Your activity upon discharge: Discharge instructions:  No lifting of more than 10 pounds, no bending, no twisting until follow up visit.  Wear brace when out of bed.    Ok to shampoo hair, shower or bathe, but, do not scrub or submerge incision under water until evaluated post-operatively in clinic.    Ok to walk as tolerated, avoid bed rest and prolonged sitting.    No contact sports until after follow up visit  No high impact activities such as; running/jogging, snowmobile or [place] riding or any other recreational vehicles.    Do not take NSAIDS (ibuprofen, advil, aleve, naproxen, etc) for pain control.    Do NOT drive while taking narcotic pain medication.    Call the [facility] at [phone] for increasing redness, swelling or pus draining from the incision, increased pain or any other questions and concerns.     Wound care and dressings    Instructions to care for your wound at home: Keep your incision clean and dry at all times.  OK to remove dressing on postop day 2.  OK to shower on postop day 3 and allow water to run over incision, pat dry after shower.  No bathing swimming or submerging in water.  Call immediately or come to ED if any drainage occurs, or you develop new pain, redness, or swelling.     Follow Up ([facility])    With PMD (or clinic) before you restart the Hydrochlorothiazide.  (You should follow up sometime early next week.)  It is OK for you to continue the Lisinopril, but stop if you find that you are dizzy when you stand up.     Diet    Follow this diet upon discharge: Orders Placed This Encounter      Advance Diet as Tolerated: Regular Diet Adult     Discharge Medications   Current Discharge Medication List      START taking these medications    Details   order for DME Equipment being ordered: Walker Wheels  () and Walker ()  Treatment Diagnosis: impaired gait  Qty: 1 each, Refills: 0    Associated Diagnoses: S/P lumbar fusion      oxyCODONE (ROXICODONE) 5 MG tablet Take 1-2 tablets (5-10 mg) by mouth every 3 hours as needed for moderate to severe pain  Qty: 75 tablet, Refills: 0    Associated Diagnoses: S/P lumbar fusion         CONTINUE these medications which have NOT CHANGED    Details   ATORVASTATIN CALCIUM PO Take 80 mg by mouth daily       gabapentin (NEURONTIN) 300 MG capsule Take 300 mg by mouth 3 times daily      glipiZIDE (GLUCOTROL) 5 MG tablet Take 5 mg by mouth 2 times daily (before meals)      lisinopril (PRINIVIL/ZESTRIL) 20 MG tablet Take 20 mg by mouth daily      omeprazole (PRILOSEC) 40 MG DR capsule Take 1 capsule (40 mg) by mouth every morning  Qty: 90 capsule, Refills: 3    Associated Diagnoses: Gastroesophageal reflux disease without esophagitis         STOP taking these medications       cyclobenzaprine (FLEXERIL) 10 MG tablet Comments:   Reason for Stopping:         hydrochlorothiazide (HYDRODIURIL) 25 MG tablet Comments:   Reason for Stopping:             Allergies   Allergies   Allergen Reactions     Morphine      Penicillins Rash

## 2019-03-06 NOTE — PLAN OF CARE
Low grade tempt x1, LS clear bilaterally, encouraged hourly CDB/IS x10, rechecked afebrile.  Pain managed with oral pain meds + cold.  No nausea.  LS clear bilaterally, RA.  CMS+ except baseline neuropathy bilateral hands/thighs.  Incision approximated staples intact no drainage, CLIFF.  Voiding, LBM this shift.  Up with SBA belt walker + brace OOB, ambulated hallway.  Anticipated DC home tomorrow.

## 2019-03-06 NOTE — PLAN OF CARE
All discharge education completed. Follow up appt made. AVS signed. Discharge meds given. Slip signed. All pt belongings collected and sent home with pt. Friend here to transport pt home. Adequate for discharge.

## 2019-03-06 NOTE — PLAN OF CARE
A&O x4. Up w/Ax1 w/brace and walker. BS hypoactive, passing flatus, last BM 3/5. Voiding well. Tolerating regular diet well. CMS: numbness both thighs and hands. SL. Incision staples open to air. Pain managed w/PRN oxycodone and valium. Plans to discharge home today.

## 2019-03-08 ENCOUNTER — TELEPHONE (OUTPATIENT)
Dept: INTERNAL MEDICINE | Facility: CLINIC | Age: 59
End: 2019-03-08

## 2019-03-08 NOTE — TELEPHONE ENCOUNTER
IP F/U    Date: 03/06/19  Diagnosis: Severe Right Foraminal Stenosis With Inretractable Right Le Pain With Previous Right L5-S1 Resecton of Synovial Cys  Is patient active in care coordination? No  Was patient in TCU? No    Next 5 appointments (look out 90 days)    Mar 11, 2019 11:00 AM CDT  Return Visit with  Neuro Nurse  [facility] ([facility]) [address] [place] [id number]  [phone]   Apr 10, 2019 12:00 PM CDT  Return Visit with Audrey Fowler PA-C  [facility] ([facility]) [address] [place] [id number]  [phone]

## 2019-03-11 ENCOUNTER — TELEPHONE (OUTPATIENT)
Dept: NEUROSURGERY | Facility: CLINIC | Age: 59
End: 2019-03-11

## 2019-03-11 DIAGNOSIS — Z98.1 S/P LUMBAR FUSION: ICD-10-CM

## 2019-03-11 RX ORDER — OXYCODONE HYDROCHLORIDE 5 MG/1
5-10 TABLET ORAL EVERY 4 HOURS PRN
Qty: 75 TABLET | Refills: 0 | Status: CANCELLED | OUTPATIENT
Start: 2019-03-11

## 2019-03-11 NOTE — TELEPHONE ENCOUNTER
Returned call to patient. He is s/p lumbar fusion 3/1/19 with Dr Woodruff. Patient is requesting refill on his oxycodone. Patient states he has 2 tabs remaining. He said he takes 2-3 pills every 3 hours. Discussed with patient that the instructions are 1-2 tabs every 3 hours and its very important to take medication as directed and not to overuse.Patient said he misread the instructions.  Patient reports incision site pain and stiffness/soreness from surgery. Patient to apply ice and can take extra strength tylenol in between.He denies any s/sx infection. If refill approved, he would like to p/u at [facility] [place] location.

## 2019-03-12 RX ORDER — OXYCODONE HYDROCHLORIDE 5 MG/1
5-10 TABLET ORAL EVERY 4 HOURS PRN
Qty: 60 TABLET | Refills: 0 | Status: SHIPPED | OUTPATIENT
Start: 2019-03-12 | End: 2019-03-18

## 2019-03-17 ENCOUNTER — NURSE TRIAGE (OUTPATIENT)
Dept: NURSING | Facility: CLINIC | Age: 59
End: 2019-03-17

## 2019-03-17 NOTE — TELEPHONE ENCOUNTER
"Luis missed his appointment on 3/15/2019 to have the staples removed from his back. He had spinal surgery 3/1/2019 and was scheduled to have the staples removed on 3/15/2019. He over slept and missed the appointment.    He said they are bothering him a lot and even weeping.  He kept saying \"they have to come out today\".    Krissy Gibson NP is on call for neuro and was paged to call Luis, [phone].  She was paged at 9:50am I asked Luis to call back and request a second page if he hadn't been called by 10:10 a.m.  He stated agreement.  "

## 2019-03-18 ENCOUNTER — OFFICE VISIT (OUTPATIENT)
Dept: NEUROSURGERY | Facility: CLINIC | Age: 59
End: 2019-03-18
Attending: NURSE PRACTITIONER
Payer: COMMERCIAL

## 2019-03-18 ENCOUNTER — TELEPHONE (OUTPATIENT)
Dept: NEUROSURGERY | Facility: CLINIC | Age: 59
End: 2019-03-18

## 2019-03-18 VITALS
HEART RATE: 92 BPM | SYSTOLIC BLOOD PRESSURE: 147 MMHG | TEMPERATURE: 96.9 F | OXYGEN SATURATION: 98 % | DIASTOLIC BLOOD PRESSURE: 90 MMHG | HEIGHT: 67 IN | BODY MASS INDEX: 38.61 KG/M2 | WEIGHT: 246 LBS

## 2019-03-18 DIAGNOSIS — Z98.1 S/P LUMBAR FUSION: ICD-10-CM

## 2019-03-18 PROCEDURE — 40000269 ZZH STATISTIC NO CHARGE FACILITY FEE

## 2019-03-18 PROCEDURE — 99207 ZZC NO CHARGE NURSE ONLY: CPT

## 2019-03-18 RX ORDER — OXYCODONE HYDROCHLORIDE 5 MG/1
5-10 TABLET ORAL EVERY 4 HOURS PRN
Qty: 60 TABLET | Refills: 0 | Status: CANCELLED | OUTPATIENT
Start: 2019-03-18

## 2019-03-18 RX ORDER — OXYCODONE HYDROCHLORIDE 5 MG/1
5-10 TABLET ORAL EVERY 4 HOURS PRN
Qty: 60 TABLET | Refills: 0 | Status: SHIPPED | OUTPATIENT
Start: 2019-03-18 | End: 2019-04-10

## 2019-03-18 ASSESSMENT — PAIN SCALES - GENERAL: PAINLEVEL: MODERATE PAIN (5)

## 2019-03-18 ASSESSMENT — MIFFLIN-ST. JEOR: SCORE: 1884.48

## 2019-03-18 NOTE — PATIENT INSTRUCTIONS
- Keep your incision clean and dry at all times. No bathing swimming or submerging in water.  Call immediately or come to ED if any drainage occurs, or you develop new pain, redness, or swelling.      - Return in 4 weeks for follow up appointment and xray     - Brace on when out of bed. No lifting greater than 10-15 pounds. No bending, twisting, or overhead reaching.        Please call our clinic with any questions or concerns: [phone]

## 2019-03-18 NOTE — NURSING NOTE
"Luis Godinez is a 58 year old male who presents for:  Chief Complaint   Patient presents with     Neurologic Problem     Staple removal status post lumbar fusion DOS 03/01/2019        Vitals:    Vitals:    03/18/19 1031   BP: 147/90   BP Location: Right arm   Patient Position: Sitting   Cuff Size: Adult Large   Pulse: 92   Temp: 96.9  F (36.1  C)   TempSrc: Oral   SpO2: 98%   Weight: 246 lb (111.6 kg)   Height: 5' 7\" (1.702 m)       BMI:  Estimated body mass index is 38.53 kg/m  as calculated from the following:    Height as of this encounter: 5' 7\" (1.702 m).    Weight as of this encounter: 246 lb (111.6 kg).    Pain Score:  Moderate Pain (5)      Do you feel safe in your environment?  Yes      Alee Taylor"

## 2019-03-18 NOTE — LETTER
3/18/2019         RE: Luis Godinez  [address] [place] [facility] [address]        Dear Colleague,    Thank you for referring your patient, Luis Godinez, to the [facility]. Please see a copy of my visit note below.    Suture/Staple removal visit note:  S:  Patient has moderate pain rated 5/10, and has no current concerns or questions regarding post-surgical plan of care.  O:  [place] removed by Racquel Taylor MA and Gretchen Novak RN , wound is healing well without erythema, fluctuation, or induration. Incision is clean and dry, mild scabbing.  Non-edematous.  A: Patient returned to clinic post-op for staple removal.  Patient tolerated procedure without incident.   P:       - Keep your incision clean and dry at all times. No bathing swimming or submerging in water.  Call immediately or come to ED if any drainage occurs, or you develop new pain, redness, or swelling.      - Return in 4 weeks for follow up appointment and xray     - Brace on when out of bed. No lifting greater than 10-15 pounds. No bending, twisting, or overhead reaching.     -Oxycodone refilled      Gretchen Novak RN   [facility]  [address] [address]     Tel [phone]        Again, thank you for allowing me to participate in the care of your patient.        Sincerely,         Spine/Brain Nurse

## 2019-03-18 NOTE — TELEPHONE ENCOUNTER
Returned call. Spoke to patient. Discussed process and technique of staple removal. Informed patient that he is 4 days overdue and the staples need to come out ASAP. Discussed patient that we do not numb patients prior to staple removal. We encourage patients to take their pain medication prior and to have a . Patient verbalized understanding. He has appt today at  at 11 am.

## 2019-03-18 NOTE — TELEPHONE ENCOUNTER
REASON FOR CALL: Pt called to schedule staple removal. After scheduling appt, pt asked if he will be numb during the process. Explained to pt that we do not do that in our clinic, and that we recommend pts to take pain relievers prior to process. Pt thinks that it will be painful, and does not wanting to go through his PCP as well. Has asked if he can get it done at the hospital instead. Can you give him a call to discuss staple removal process and level of pain pt may experience.

## 2019-03-18 NOTE — PROGRESS NOTES
Suture/Staple removal visit note:  S:  Patient has moderate pain rated 5/10, and has no current concerns or questions regarding post-surgical plan of care.  O:  [place] removed by Racquel Taylor MA and Gretchen Novak RN , wound is healing well without erythema, fluctuation, or induration. Incision is clean and dry, mild scabbing.  Non-edematous.  A: Patient returned to clinic post-op for staple removal.  Patient tolerated procedure without incident.   P:       - Keep your incision clean and dry at all times. No bathing swimming or submerging in water.  Call immediately or come to ED if any drainage occurs, or you develop new pain, redness, or swelling.      - Return in 4 weeks for follow up appointment and xray     - Brace on when out of bed. No lifting greater than 10-15 pounds. No bending, twisting, or overhead reaching.     -Oxycodone refilled      Gretchen Novak RN   [facility]  [address] [address]     Tel [phone]

## 2019-04-10 ENCOUNTER — ANCILLARY PROCEDURE (OUTPATIENT)
Dept: GENERAL RADIOLOGY | Facility: CLINIC | Age: 59
End: 2019-04-10
Attending: NURSE PRACTITIONER
Payer: COMMERCIAL

## 2019-04-10 ENCOUNTER — OFFICE VISIT (OUTPATIENT)
Dept: NEUROSURGERY | Facility: CLINIC | Age: 59
End: 2019-04-10
Attending: PHYSICIAN ASSISTANT
Payer: COMMERCIAL

## 2019-04-10 VITALS
HEART RATE: 85 BPM | BODY MASS INDEX: 37.04 KG/M2 | HEIGHT: 67 IN | DIASTOLIC BLOOD PRESSURE: 66 MMHG | OXYGEN SATURATION: 98 % | TEMPERATURE: 98.2 F | SYSTOLIC BLOOD PRESSURE: 121 MMHG | WEIGHT: 236 LBS

## 2019-04-10 DIAGNOSIS — Z98.1 S/P LUMBAR FUSION: Primary | ICD-10-CM

## 2019-04-10 DIAGNOSIS — Z98.1 S/P LUMBAR FUSION: ICD-10-CM

## 2019-04-10 PROCEDURE — 72100 X-RAY EXAM L-S SPINE 2/3 VWS: CPT

## 2019-04-10 PROCEDURE — G0463 HOSPITAL OUTPT CLINIC VISIT: HCPCS

## 2019-04-10 PROCEDURE — 99024 POSTOP FOLLOW-UP VISIT: CPT | Performed by: PHYSICIAN ASSISTANT

## 2019-04-10 RX ORDER — OXYCODONE HYDROCHLORIDE 5 MG/1
5-10 TABLET ORAL EVERY 4 HOURS PRN
Qty: 60 TABLET | Refills: 0 | Status: SHIPPED | OUTPATIENT
Start: 2019-04-10 | End: 2019-05-22

## 2019-04-10 RX ORDER — OXYCODONE HYDROCHLORIDE 5 MG/1
5-10 TABLET ORAL EVERY 4 HOURS PRN
Qty: 60 TABLET | Refills: 0 | Status: SHIPPED | OUTPATIENT
Start: 2019-04-10 | End: 2019-04-10

## 2019-04-10 ASSESSMENT — MIFFLIN-ST. JEOR: SCORE: 1839.12

## 2019-04-10 ASSESSMENT — PAIN SCALES - GENERAL: PAINLEVEL: WORST PAIN (10)

## 2019-04-10 NOTE — NURSING NOTE
"Luis Godinez is a 58 year old male who presents for:  No chief complaint on file.       Initial Vitals:  /66 (BP Location: Left arm, Patient Position: Sitting, Cuff Size: Adult Large)   Pulse 85   Temp 98.2  F (36.8  C) (Oral)   Ht 5' 7\" (1.702 m)   Wt 236 lb (107 kg)   SpO2 98%   BMI 36.96 kg/m   Estimated body mass index is 36.96 kg/m  as calculated from the following:    Height as of this encounter: 5' 7\" (1.702 m).    Weight as of this encounter: 236 lb (107 kg).. Body surface area is 2.25 meters squared. BP completed using cuff size: large  Worst Pain (10)        Nursing Comments: Pain level is at a 10.         Shanel Alejandra      "

## 2019-04-10 NOTE — PROGRESS NOTES
[facility]  Neurosurgery followup:    HPI: 6 weeks s/p redo L5-S1 TLIF. Continues to slowly recover with good resolution of right hip and leg pain. He does have some soreness and low back pain, but overall feels as though he is improving. No weakness.  Exam:  Constitutional:  Alert, well nourished, NAD.  HEENT: Normocephalic, atraumatic.   Pulm:  Without shortness of breath   CV:  No pitting edema of BLE.      Neurological:  Awake  Alert  Oriented x 3  Motor exam:        IP Q DF PF EHL  R   5  5   5   5    5  L   5  5   5   5    5     Reflexes are 2+ in the patellar and Achilles. There is no clonus. Downgoing Babinski.    Able to spontaneously move L/E bilaterally  Sensation intact throughout all L/E dermatomes     Incision: Healing nicely with mild swelling and no signs of infection.   Imaging: AP and lateral lumbar films reveal stable hardware  A/P: 6 weeks s/p redo L5-S1 TLIF. Recovering well with good resolution of right hip and leg pain. Still with some localized low back pain, which I assured him is common at this point. XRs with stable hardware. Full strength on exam. Oxycodone refilled today and discussed beginning weaning process. Will have him f/u in 6 weeks with xray prior. Patient voiced understanding and agreement.  - May increase lifting restriction to 20 pounds   - followup in 6 weeks with xray prior   - Call the clinic at [phone] for increased pain or any other questions and concerns.      Audrey Fowler PA-C  [facility]  [address]  [address] [place] [address]    Tel [phone]  Pager [phone]

## 2019-04-10 NOTE — PATIENT INSTRUCTIONS
- Oxycodone refilled today - continue to wean  - May increase lifting restriction to 20 pounds   - followup in 6 weeks with xray prior   - Call the clinic at [phone] for increased pain or any other questions and concerns.

## 2019-04-10 NOTE — LETTER
4/10/2019         RE: Luis Godinez  [address] [place] [facility] [address]        Dear Colleague,    Thank you for referring your patient, Luis Godinez, to the [facility]. Please see a copy of my visit note below.    [facility]  Neurosurgery followup:    HPI: 6 weeks s/p redo L5-S1 TLIF. Continues to slowly recover with good resolution of right hip and leg pain. He does have some soreness and low back pain, but overall feels as though he is improving. No weakness.  Exam:  Constitutional:  Alert, well nourished, NAD.  HEENT: Normocephalic, atraumatic.   Pulm:  Without shortness of breath   CV:  No pitting edema of BLE.      Neurological:  Awake  Alert  Oriented x 3  Motor exam:        IP Q DF PF EHL  R   5  5   5   5    5  L   5  5   5   5    5     Reflexes are 2+ in the patellar and Achilles. There is no clonus. Downgoing Babinski.    Able to spontaneously move L/E bilaterally  Sensation intact throughout all L/E dermatomes     Incision: Healing nicely with mild swelling and no signs of infection.   Imaging: AP and lateral lumbar films reveal stable hardware  A/P: 6 weeks s/p redo L5-S1 TLIF. Recovering well with good resolution of right hip and leg pain. Still with some localized low back pain, which I assured him is common at this point. XRs with stable hardware. Full strength on exam. Oxycodone refilled today and discussed beginning weaning process. Will have him f/u in 6 weeks with xray prior. Patient voiced understanding and agreement.  - May increase lifting restriction to 20 pounds   - followup in 6 weeks with xray prior   - Call the clinic at [phone] for increased pain or any other questions and concerns.      Audrey Fowler PA-C  [facility]  [address]  [address] [place] [address], [place] [address]    Tel [phone]  Pager [phone]      Again, thank you for allowing me to participate in the care of your patient.         Sincerely,        Audrey Fowler PA-C

## 2019-05-22 ENCOUNTER — ANCILLARY PROCEDURE (OUTPATIENT)
Dept: GENERAL RADIOLOGY | Facility: CLINIC | Age: 59
End: 2019-05-22
Attending: PHYSICIAN ASSISTANT
Payer: COMMERCIAL

## 2019-05-22 ENCOUNTER — OFFICE VISIT (OUTPATIENT)
Dept: NEUROSURGERY | Facility: CLINIC | Age: 59
End: 2019-05-22
Attending: PHYSICIAN ASSISTANT
Payer: COMMERCIAL

## 2019-05-22 VITALS
DIASTOLIC BLOOD PRESSURE: 82 MMHG | TEMPERATURE: 97 F | HEIGHT: 67 IN | SYSTOLIC BLOOD PRESSURE: 135 MMHG | WEIGHT: 252 LBS | BODY MASS INDEX: 39.55 KG/M2 | OXYGEN SATURATION: 96 % | HEART RATE: 95 BPM

## 2019-05-22 DIAGNOSIS — Z98.1 S/P LUMBAR FUSION: Primary | ICD-10-CM

## 2019-05-22 DIAGNOSIS — Z98.1 S/P LUMBAR FUSION: ICD-10-CM

## 2019-05-22 PROCEDURE — 99024 POSTOP FOLLOW-UP VISIT: CPT | Performed by: PHYSICIAN ASSISTANT

## 2019-05-22 PROCEDURE — 72100 X-RAY EXAM L-S SPINE 2/3 VWS: CPT

## 2019-05-22 PROCEDURE — G0463 HOSPITAL OUTPT CLINIC VISIT: HCPCS

## 2019-05-22 RX ORDER — OXYCODONE HYDROCHLORIDE 5 MG/1
5 TABLET ORAL EVERY 6 HOURS PRN
Qty: 30 TABLET | Refills: 0 | Status: SHIPPED | OUTPATIENT
Start: 2019-05-22

## 2019-05-22 ASSESSMENT — MIFFLIN-ST. JEOR: SCORE: 1911.69

## 2019-05-22 ASSESSMENT — PAIN SCALES - GENERAL: PAINLEVEL: SEVERE PAIN (7)

## 2019-05-22 NOTE — NURSING NOTE
"Luis Godinez is a 58 year old male who presents for:  Chief Complaint   Patient presents with     Neurologic Problem        Initial Vitals:  /82   Pulse 95   Temp 97  F (36.1  C)   Ht 5' 7\" (1.702 m)   Wt 252 lb (114.3 kg)   SpO2 96%   BMI 39.47 kg/m   Estimated body mass index is 39.47 kg/m  as calculated from the following:    Height as of this encounter: 5' 7\" (1.702 m).    Weight as of this encounter: 252 lb (114.3 kg).. Body surface area is 2.32 meters squared. BP completed   Severe Pain (7)        Nursing Comments: states he has LBP that does not cause weakness or radiating, also right knee pain and ankles are swollen          Delmy Holland LPN  [facility]    "

## 2019-05-22 NOTE — PROGRESS NOTES
[facility]  Neurosurgery followup:    HPI: 3 months s/p redo L5-S1 TLIF with Dr. Woodruff. Continues to recover well. States he is able to ambulate and stand for longer periods of time. He does still have some tenderness and localized back pain at incision site. No weakness.  Exam:  Constitutional:  Alert, well nourished, NAD.  HEENT: Normocephalic, atraumatic.   Pulm:  Without shortness of breath   CV:  No pitting edema of BLE.      Neurological:  Awake  Alert  Oriented x 3  Motor exam:        IP Q DF PF EHL  R   5  5   5   5    5  L   5  5   5   5    5     Reflexes are 2+ in the patellar and Achilles. There is no clonus. Downgoing Babinski.    Able to spontaneously move L/E bilaterally  Sensation intact throughout all L/E dermatomes     Incision: Nicely healed  Imaging: AP and lateral lumbar films reveal stable hardware  A/P: 3 months s/p redo L5-S1 TLIF with Dr. Woodruff. Continues to slowly recover. XRs with stable hardware. Incision site nicely healed. Full strength on exam. Discussed will do one more short term fill and advised will need referral to pain clinic if needs fills beyond this. Will also have him start a course of physical therapy. Use heat/ice and massage as needed for incisional site pain. Follow up in 3 months with xray prior. Patient voiced understanding and agreement.        Audrey Fowler PA-C  [facility]  [address] [address]    Tel [phone]  Pager [phone]

## 2019-05-22 NOTE — LETTER
5/22/2019         RE: Luis Godinez  [address] [place] [address]        Dear Colleague,    Thank you for referring your patient, Luis Goidnez, to the [facility]. Please see a copy of my visit note below.    [facility]  Neurosurgery followup:    HPI: 3 months s/p redo L5-S1 TLIF with Dr. Woodruff. Continues to recover well. States he is able to ambulate and stand for longer periods of time. He does still have some tenderness and localized back pain at incision site. No weakness.  Exam:  Constitutional:  Alert, well nourished, NAD.  HEENT: Normocephalic, atraumatic.   Pulm:  Without shortness of breath   CV:  No pitting edema of BLE.      Neurological:  Awake  Alert  Oriented x 3  Motor exam:        IP Q DF PF EHL  R   5  5   5   5    5  L   5  5   5   5    5     Reflexes are 2+ in the patellar and Achilles. There is no clonus. Downgoing Babinski.    Able to spontaneously move L/E bilaterally  Sensation intact throughout all L/E dermatomes     Incision: Nicely healed  Imaging: AP and lateral lumbar films reveal stable hardware  A/P: 3 months s/p redo L5-S1 TLIF with Dr. Woodruff. Continues to slowly recover. XRs with stable hardware. Incision site nicely healed. Full strength on exam. Discussed will do one more short term fill and advised will need referral to pain clinic if needs fills beyond this. Will also have him start a course of physical therapy. Use heat/ice and massage as needed for incisional site pain. Follow up in 3 months with xray prior. Patient voiced understanding and agreement.        Audrey Fowler PA-C  [facility]  [address] [address]    Tel [phone]  Pager [phone]      Again, thank you for allowing me to participate in the care of your patient.        Sincerely,        Audrey Fowler PA-C

## 2019-05-22 NOTE — PATIENT INSTRUCTIONS
-Oxycodone refilled for one last fill - if further pain medication needed, will need to follow up with pain clinic  -Physical therapy referral placed - they will contact you to schedule  - Use heat/ice or massage on incisional site  - followup in 3 months with xray prior   - Call the clinic at [phone] for increased pain or any other questions and concerns.

## 2019-08-21 ENCOUNTER — ANCILLARY PROCEDURE (OUTPATIENT)
Dept: GENERAL RADIOLOGY | Facility: CLINIC | Age: 59
End: 2019-08-21
Attending: PHYSICIAN ASSISTANT
Payer: COMMERCIAL

## 2019-08-21 ENCOUNTER — OFFICE VISIT (OUTPATIENT)
Dept: NEUROSURGERY | Facility: CLINIC | Age: 59
End: 2019-08-21
Attending: PHYSICIAN ASSISTANT
Payer: COMMERCIAL

## 2019-08-21 VITALS
HEIGHT: 68 IN | TEMPERATURE: 97.6 F | DIASTOLIC BLOOD PRESSURE: 84 MMHG | SYSTOLIC BLOOD PRESSURE: 138 MMHG | WEIGHT: 257 LBS | RESPIRATION RATE: 16 BRPM | BODY MASS INDEX: 38.95 KG/M2 | HEART RATE: 96 BPM

## 2019-08-21 DIAGNOSIS — Z98.1 S/P LUMBAR FUSION: ICD-10-CM

## 2019-08-21 DIAGNOSIS — Z98.1 S/P LUMBAR FUSION: Primary | ICD-10-CM

## 2019-08-21 PROCEDURE — 99213 OFFICE O/P EST LOW 20 MIN: CPT | Performed by: PHYSICIAN ASSISTANT

## 2019-08-21 PROCEDURE — 72100 X-RAY EXAM L-S SPINE 2/3 VWS: CPT

## 2019-08-21 PROCEDURE — G0463 HOSPITAL OUTPT CLINIC VISIT: HCPCS

## 2019-08-21 ASSESSMENT — MIFFLIN-ST. JEOR: SCORE: 1950.24

## 2019-08-21 ASSESSMENT — PAIN SCALES - GENERAL: PAINLEVEL: WORST PAIN (10)

## 2019-08-21 NOTE — PATIENT INSTRUCTIONS
- Continue with land/pool therapy  - continue to increase activity as tolerated  - followup in 6 months with xray prior   - Call the clinic at [phone] for increased pain or any other questions and concerns.

## 2019-08-21 NOTE — PROGRESS NOTES
[facility]  Neurosurgery followup:    HPI: 6 months s/p redo L5-S1 TLIF with Dr. Tereso Woodruff. States he does still have some ongoing low back pain, but overall much better than prior to surgery. He has been doing land, and recently started pool therapy. He feels as though pool therapy will be very beneficial for him. No weakness.  Exam:  Constitutional:  Alert, well nourished, NAD.  HEENT: Normocephalic, atraumatic.   Pulm:  Without shortness of breath   CV:  No pitting edema of BLE.      Neurological:  Awake  Alert  Oriented x 3  Motor exam:        IP Q DF PF EHL  R   5  5   5   5    5  L   5  5   5   5    5     Reflexes are 2+ in the patellar and Achilles. There is no clonus. Downgoing Babinski.    Able to spontaneously move L/E bilaterally  Sensation intact throughout all L/E dermatomes     Incision: Nicely healed  Imaging: AP and lateral lumbar films reveal stable hardware  A/P: 6 months s/p redo L5-S1 TLIF with Dr. Tereso Woodruff. Continues to slowly recover. Has been doing therapy, which has been helpful. XRs with stable hardware. Incision nicely healed. Recommend continuing with land/pool therapy. Follow up in 6 months with xray prior. Patient voiced understanding and agreement.  - Continue with land/pool therapy  - continue to increase activity as tolerated  - followup in 6 months with xray prior   - Call the clinic at [phone] for increased pain or any other questions and concerns.      Audrey Fowler PA-C  [facility]  [address]  [address] [place] [id number]    Tel [phone]  Pager [phone]

## 2019-08-21 NOTE — LETTER
8/21/2019         RE: Luis Godinez  [address] [place] [address]        Dear Colleague,    Thank you for referring your patient, Luis Godinez, to the [facility]. Please see a copy of my visit note below.    [facility]  Neurosurgery followup:    HPI: 6 months s/p redo L5-S1 TLIF with Dr. Tereso Woodruff. States he does still have some ongoing low back pain, but overall much better than prior to surgery. He has been doing land, and recently started pool therapy. He feels as though pool therapy will be very beneficial for him. No weakness.  Exam:  Constitutional:  Alert, well nourished, NAD.  HEENT: Normocephalic, atraumatic.   Pulm:  Without shortness of breath   CV:  No pitting edema of BLE.      Neurological:  Awake  Alert  Oriented x 3  Motor exam:        IP Q DF PF EHL  R   5  5   5   5    5  L   5  5   5   5    5     Reflexes are 2+ in the patellar and Achilles. There is no clonus. Downgoing Babinski.    Able to spontaneously move L/E bilaterally  Sensation intact throughout all L/E dermatomes     Incision: Nicely healed  Imaging: AP and lateral lumbar films reveal stable hardware  A/P: 6 months s/p redo L5-S1 TLIF with Dr. Tereso Woodruff. Continues to slowly recover. Has been doing therapy, which has been helpful. XRs with stable hardware. Incision nicely healed. Recommend continuing with land/pool therapy. Follow up in 6 months with xray prior. Patient voiced understanding and agreement.  - Continue with land/pool therapy  - continue to increase activity as tolerated  - followup in 6 months with xray prior   - Call the clinic at [phone] for increased pain or any other questions and concerns.      Audrey Fowler PA-C  [facility]  [address]  [address] [place] [address], [place] [address]    Tel [phone]  Pager [phone]      Again, thank you for allowing me to participate in the care of your patient.         Sincerely,        Audrey Fowler PA-C

## 2019-08-21 NOTE — NURSING NOTE
"Luis Godinez is a 58 year old male who presents for:  Chief Complaint   Patient presents with     Surgical Followup     re do right L5 S1 TLIF        Initial Vitals:  /84   Pulse 96   Temp 97.6  F (36.4  C)   Resp 16   Ht 5' 8\" (1.727 m)   Wt 257 lb (116.6 kg)   BMI 39.08 kg/m   Estimated body mass index is 39.08 kg/m  as calculated from the following:    Height as of this encounter: 5' 8\" (1.727 m).    Weight as of this encounter: 257 lb (116.6 kg).. Body surface area is 2.37 meters squared. BP completed using cuff size: large  Worst Pain (10)        Nursing Comments: Pt present today for post op f/u. Pt states that he is in constant pain of 10/10.    Pt states that he unable to read or write therefore, he is unable to feel the Oswestry and Promis form.        Faustino Anaya, HILARIA    "

## 2020-06-09 ENCOUNTER — RECORDS - HEALTHEAST (OUTPATIENT)
Dept: ADMINISTRATIVE | Facility: OTHER | Age: 60
End: 2020-06-09

## 2020-06-16 ENCOUNTER — COMMUNICATION - HEALTHEAST (OUTPATIENT)
Dept: LAB | Facility: CLINIC | Age: 60
End: 2020-06-16

## 2021-06-04 ENCOUNTER — RECORDS - HEALTHEAST (OUTPATIENT)
Dept: ADMINISTRATIVE | Facility: CLINIC | Age: 61
End: 2021-06-04

## 2021-06-05 ENCOUNTER — RECORDS - HEALTHEAST (OUTPATIENT)
Dept: ADMINISTRATIVE | Facility: CLINIC | Age: 61
End: 2021-06-05

## 2021-06-08 NOTE — TELEPHONE ENCOUNTER
Luis is on [facility] Lab Schedule.  He requested orders to be faxed from [facility].      I called him because he has not been seen at [facility] since 2014 and has no Primary here.  He can not have labs at this clinic.      I was able to find a phone number for [facility].  He will call them to see if they can see the orders.  If not, he will get their fax number and schedule a lab appointment with them then get his provider at [facility] to fax orders.    He was, understandably, irritated about the run around but not hostile.

## (undated) DEVICE — IMP SCR MEDT 5.5/6.0MM SOLERA 6.5X40MM MA 55840006540: Type: IMPLANTABLE DEVICE | Site: SPINE LUMBAR | Status: NON-FUNCTIONAL

## (undated) DEVICE — ESU BIPOLAR SEALER AQUAMANTYS 6MM 23-112-1

## (undated) DEVICE — GLOVE PROTEXIS MICRO 7.5  2D73PM75

## (undated) DEVICE — DEVICE DUST COLLECTOR BONE BOX S-3500

## (undated) DEVICE — MIDAS REX DISSECTING TOOL  14MH30

## (undated) DEVICE — STPL SKIN PROXIMATE 35 WIDE PMW35

## (undated) DEVICE — SUCTION MANIFOLD NEPTUNE 2 SYS 4 PORT 0702-020-000

## (undated) DEVICE — TUBING SUCTION 12"X1/4" N612

## (undated) DEVICE — DRSG TELFA ISLAND 4X10"

## (undated) DEVICE — DECANTER BAG 2002S

## (undated) DEVICE — LINEN DRAPE 54X72" 5467

## (undated) DEVICE — DRAPE MICROSCOPE OPMI ZEISS 48X118" 306071-0000-000

## (undated) DEVICE — GLOVE PROTEXIS BLUE W/NEU-THERA 8.5  2D73EB85

## (undated) DEVICE — ESU GROUND PAD ADULT W/CORD E7507

## (undated) DEVICE — GOWN XLG DISP 9545

## (undated) DEVICE — SU VICRYL 2-0 CT-1 18' J739D

## (undated) DEVICE — CATH TRAY FOLEY COUDE SURESTEP 16FR W/DRN BAG LATEX A304416A

## (undated) DEVICE — Device

## (undated) DEVICE — PEN MARKING SKIN W/LABELS 31145884

## (undated) DEVICE — NDL BLUNT 18GA 1" W/O FILTER 305181

## (undated) DEVICE — SPONGE SURGIFOAM 100 1974

## (undated) DEVICE — BLADE CLIPPER SGL USE 9680

## (undated) DEVICE — MARKER SPHERES PASSIVE MEDT PACK 5 8801075

## (undated) DEVICE — SYR 10ML FINGER CONTROL W/O NDL 309695

## (undated) DEVICE — RX SURGIFLO HEMOSTATIC MATRIX 8ML 2991

## (undated) DEVICE — SOL NACL 0.9% INJ 250ML BAG 2B1322Q

## (undated) DEVICE — LINEN ORTHO ACL PACK 5447

## (undated) DEVICE — GLOVE PROTEXIS W/NEU-THERA 8.5  2D73TE85

## (undated) DEVICE — SU DERMABOND ADVANCED .7ML DNX12

## (undated) DEVICE — DRSG GAUZE 4X4" 8044

## (undated) DEVICE — SU WND CLOSURE VLOC 180 ABS 3-0 12" P-14 VLOCL0114

## (undated) DEVICE — LINEN POUCH DBL 5427

## (undated) DEVICE — PACK SET-UP STD 9102

## (undated) DEVICE — DRAIN JACKSON PRATT CHANNEL 10FR RND SIL W/TROCAR JP-2227

## (undated) DEVICE — DRSG KERLIX FLUFFS X5

## (undated) DEVICE — SU VICRYL 0 CT-1 CR 8X18" J740D

## (undated) DEVICE — NDL 22GA 1.5"

## (undated) DEVICE — DRAIN JACKSON PRATT RESERVOIR 100ML SU130-1305

## (undated) DEVICE — ESU CLEANER TIP 31142717

## (undated) DEVICE — GOWN REINFORCED XXLG 9071

## (undated) DEVICE — PACK SMALL SPINE RIDGES

## (undated) DEVICE — STPL SKIN 35W APPOSE 8886803712

## (undated) DEVICE — SUCTION FRAZIER 12FR W/OBTURATOR 33120

## (undated) DEVICE — POSITIONER PT PRONESAFE HEAD REST W/DERMAPROX INSERT 40599

## (undated) RX ORDER — FENTANYL CITRATE 50 UG/ML
INJECTION, SOLUTION INTRAMUSCULAR; INTRAVENOUS
Status: DISPENSED
Start: 2019-03-01

## (undated) RX ORDER — PROPOFOL 10 MG/ML
INJECTION, EMULSION INTRAVENOUS
Status: DISPENSED
Start: 2019-03-01

## (undated) RX ORDER — DEXAMETHASONE SODIUM PHOSPHATE 4 MG/ML
INJECTION, SOLUTION INTRA-ARTICULAR; INTRALESIONAL; INTRAMUSCULAR; INTRAVENOUS; SOFT TISSUE
Status: DISPENSED
Start: 2019-03-01

## (undated) RX ORDER — CEFAZOLIN SODIUM 1 G/3ML
INJECTION, POWDER, FOR SOLUTION INTRAMUSCULAR; INTRAVENOUS
Status: DISPENSED
Start: 2019-03-01

## (undated) RX ORDER — BUPIVACAINE HYDROCHLORIDE AND EPINEPHRINE 5; 5 MG/ML; UG/ML
INJECTION, SOLUTION EPIDURAL; INTRACAUDAL; PERINEURAL
Status: DISPENSED
Start: 2019-03-01

## (undated) RX ORDER — LIDOCAINE HYDROCHLORIDE 10 MG/ML
INJECTION, SOLUTION EPIDURAL; INFILTRATION; INTRACAUDAL; PERINEURAL
Status: DISPENSED
Start: 2019-03-01

## (undated) RX ORDER — GLYCOPYRROLATE 0.2 MG/ML
INJECTION INTRAMUSCULAR; INTRAVENOUS
Status: DISPENSED
Start: 2019-03-01

## (undated) RX ORDER — VANCOMYCIN HYDROCHLORIDE 1 G/20ML
INJECTION, POWDER, LYOPHILIZED, FOR SOLUTION INTRAVENOUS
Status: DISPENSED
Start: 2019-03-01

## (undated) RX ORDER — CEFAZOLIN SODIUM 2 G/100ML
INJECTION, SOLUTION INTRAVENOUS
Status: DISPENSED
Start: 2019-03-01

## (undated) RX ORDER — KETAMINE HCL IN 0.9 % NACL 50 MG/5 ML
SYRINGE (ML) INTRAVENOUS
Status: DISPENSED
Start: 2019-03-01

## (undated) RX ORDER — PHENYLEPHRINE HCL IN 0.9% NACL 1 MG/10 ML
SYRINGE (ML) INTRAVENOUS
Status: DISPENSED
Start: 2019-03-01

## (undated) RX ORDER — ONDANSETRON 2 MG/ML
INJECTION INTRAMUSCULAR; INTRAVENOUS
Status: DISPENSED
Start: 2019-03-01